# Patient Record
Sex: FEMALE | Race: WHITE | NOT HISPANIC OR LATINO | Employment: OTHER | URBAN - NONMETROPOLITAN AREA
[De-identification: names, ages, dates, MRNs, and addresses within clinical notes are randomized per-mention and may not be internally consistent; named-entity substitution may affect disease eponyms.]

---

## 2017-04-29 ENCOUNTER — APPOINTMENT (OUTPATIENT)
Dept: CT IMAGING | Facility: HOSPITAL | Age: 77
End: 2017-04-29

## 2017-04-29 ENCOUNTER — APPOINTMENT (OUTPATIENT)
Dept: GENERAL RADIOLOGY | Facility: HOSPITAL | Age: 77
End: 2017-04-29

## 2017-04-29 ENCOUNTER — HOSPITAL ENCOUNTER (EMERGENCY)
Facility: HOSPITAL | Age: 77
Discharge: HOME | End: 2017-04-29
Admitting: EMERGENCY MEDICINE

## 2017-04-29 VITALS
SYSTOLIC BLOOD PRESSURE: 142 MMHG | BODY MASS INDEX: 41.59 KG/M2 | RESPIRATION RATE: 16 BRPM | WEIGHT: 265 LBS | OXYGEN SATURATION: 98 % | TEMPERATURE: 98 F | DIASTOLIC BLOOD PRESSURE: 86 MMHG | HEIGHT: 67 IN | HEART RATE: 66 BPM

## 2017-04-29 DIAGNOSIS — M23.92 DERANGEMENT, KNEE INTERNAL, LEFT: ICD-10-CM

## 2017-04-29 DIAGNOSIS — S82.002A CLOSED NONDISPLACED FRACTURE OF LEFT PATELLA, UNSPECIFIED FRACTURE MORPHOLOGY, INITIAL ENCOUNTER: ICD-10-CM

## 2017-04-29 DIAGNOSIS — S16.1XXA CERVICAL STRAIN, INITIAL ENCOUNTER: ICD-10-CM

## 2017-04-29 DIAGNOSIS — R93.89 ABNORMAL CT SCAN, CHEST: ICD-10-CM
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
## 2017-04-29 DIAGNOSIS — S20.211A CHEST WALL CONTUSION, RIGHT, INITIAL ENCOUNTER: ICD-10-CM

## 2017-04-29 LAB
ALBUMIN SERPL-MCNC: 3.7 G/DL (ref 3.5–5)
ALBUMIN/GLOB SERPL: 1.2 G/DL (ref 1.1–2.5)
ALP SERPL-CCNC: 81 U/L (ref 24–120)
ALT SERPL W P-5'-P-CCNC: 33 U/L (ref 0–54)
ANION GAP SERPL CALCULATED.3IONS-SCNC: 10 MMOL/L (ref 4–13)
AST SERPL-CCNC: 41 U/L (ref 7–45)
BASOPHILS # BLD AUTO: 0.02 10*3/MM3 (ref 0–0.2)
BASOPHILS NFR BLD AUTO: 0.3 % (ref 0–2)
BILIRUB SERPL-MCNC: 0.6 MG/DL (ref 0.1–1)
BUN BLD-MCNC: 18 MG/DL (ref 5–21)
BUN/CREAT SERPL: 27.7 (ref 7–25)
CALCIUM SPEC-SCNC: 9.1 MG/DL (ref 8.4–10.4)
CHLORIDE SERPL-SCNC: 104 MMOL/L (ref 98–110)
CO2 SERPL-SCNC: 26 MMOL/L (ref 24–31)
CREAT BLD-MCNC: 0.65 MG/DL (ref 0.5–1.4)
DEPRECATED RDW RBC AUTO: 45.4 FL (ref 40–54)
EOSINOPHIL # BLD AUTO: 0.09 10*3/MM3 (ref 0–0.7)
EOSINOPHIL NFR BLD AUTO: 1.5 % (ref 0–4)
ERYTHROCYTE [DISTWIDTH] IN BLOOD BY AUTOMATED COUNT: 13.4 % (ref 12–15)
GFR SERPL CREATININE-BSD FRML MDRD: 88 ML/MIN/1.73
GLOBULIN UR ELPH-MCNC: 3.2 GM/DL
GLUCOSE BLD-MCNC: 115 MG/DL (ref 70–100)
HCT VFR BLD AUTO: 40.8 % (ref 37–47)
HGB BLD-MCNC: 14.3 G/DL (ref 12–16)
IMM GRANULOCYTES # BLD: 0.02 10*3/MM3 (ref 0–0.03)
IMM GRANULOCYTES NFR BLD: 0.3 % (ref 0–5)
LYMPHOCYTES # BLD AUTO: 0.55 10*3/MM3 (ref 0.72–4.86)
LYMPHOCYTES NFR BLD AUTO: 9.2 % (ref 15–45)
MCH RBC QN AUTO: 32.4 PG (ref 28–32)
MCHC RBC AUTO-ENTMCNC: 35 G/DL (ref 33–36)
MCV RBC AUTO: 92.3 FL (ref 82–98)
MONOCYTES # BLD AUTO: 0.49 10*3/MM3 (ref 0.19–1.3)
MONOCYTES NFR BLD AUTO: 8.2 % (ref 4–12)
NEUTROPHILS # BLD AUTO: 4.8 10*3/MM3 (ref 1.87–8.4)
NEUTROPHILS NFR BLD AUTO: 80.5 % (ref 39–78)
PLATELET # BLD AUTO: 189 10*3/MM3 (ref 130–400)
PMV BLD AUTO: 10.8 FL (ref 6–12)
POTASSIUM BLD-SCNC: 4.3 MMOL/L (ref 3.5–5.3)
PROT SERPL-MCNC: 6.9 G/DL (ref 6.3–8.7)
RBC # BLD AUTO: 4.42 10*6/MM3 (ref 4.2–5.4)
SODIUM BLD-SCNC: 140 MMOL/L (ref 135–145)
WBC NRBC COR # BLD: 5.97 10*3/MM3 (ref 4.8–10.8)

## 2017-04-29 PROCEDURE — 73562 X-RAY EXAM OF KNEE 3: CPT

## 2017-04-29 PROCEDURE — 96361 HYDRATE IV INFUSION ADD-ON: CPT

## 2017-04-29 PROCEDURE — 71260 CT THORAX DX C+: CPT

## 2017-04-29 PROCEDURE — 80053 COMPREHEN METABOLIC PANEL: CPT | Performed by: EMERGENCY MEDICINE

## 2017-04-29 PROCEDURE — 93005 ELECTROCARDIOGRAM TRACING: CPT | Performed by: EMERGENCY MEDICINE

## 2017-04-29 PROCEDURE — 99284 EMERGENCY DEPT VISIT MOD MDM: CPT

## 2017-04-29 PROCEDURE — 0 IOPAMIDOL PER 1 ML: Performed by: PHYSICIAN ASSISTANT

## 2017-04-29 PROCEDURE — 96360 HYDRATION IV INFUSION INIT: CPT

## 2017-04-29 PROCEDURE — 72125 CT NECK SPINE W/O DYE: CPT

## 2017-04-29 PROCEDURE — 71020 HC CHEST PA AND LATERAL: CPT

## 2017-04-29 PROCEDURE — 93010 ELECTROCARDIOGRAM REPORT: CPT | Performed by: INTERNAL MEDICINE

## 2017-04-29 PROCEDURE — 85025 COMPLETE CBC W/AUTO DIFF WBC: CPT | Performed by: EMERGENCY MEDICINE

## 2017-04-29 RX ORDER — MORPHINE SULFATE 4 MG/ML
4 INJECTION, SOLUTION INTRAMUSCULAR; INTRAVENOUS ONCE
Status: DISCONTINUED | OUTPATIENT
Start: 2017-04-29 | End: 2017-04-29 | Stop reason: HOSPADM

## 2017-04-29 RX ORDER — ONDANSETRON 2 MG/ML
4 INJECTION INTRAMUSCULAR; INTRAVENOUS ONCE
Status: DISCONTINUED | OUTPATIENT
Start: 2017-04-29 | End: 2017-04-29 | Stop reason: HOSPADM

## 2017-04-29 RX ORDER — SODIUM CHLORIDE 9 MG/ML
125 INJECTION, SOLUTION INTRAVENOUS CONTINUOUS
Status: DISCONTINUED | OUTPATIENT
Start: 2017-04-29 | End: 2017-04-29 | Stop reason: HOSPADM

## 2017-04-29 RX ORDER — IBUPROFEN 800 MG/1
800 TABLET ORAL ONCE
Status: COMPLETED | OUTPATIENT
Start: 2017-04-29 | End: 2017-04-29

## 2017-04-29 RX ORDER — OXYCODONE HYDROCHLORIDE AND ACETAMINOPHEN 5; 325 MG/1; MG/1
1 TABLET ORAL EVERY 4 HOURS PRN
Qty: 20 TABLET | Refills: 0 | Status: SHIPPED | OUTPATIENT
Start: 2017-04-29 | End: 2019-01-03

## 2017-04-29 RX ADMIN — IOPAMIDOL 100 ML: 755 INJECTION, SOLUTION INTRAVENOUS at 16:17

## 2017-04-29 RX ADMIN — SODIUM CHLORIDE 125 ML/HR: 9 INJECTION, SOLUTION INTRAVENOUS at 15:30

## 2017-04-29 RX ADMIN — IBUPROFEN 800 MG: 800 TABLET ORAL at 17:43

## 2017-05-05 ENCOUNTER — PROCEDURE VISIT (OUTPATIENT)
Dept: OTOLARYNGOLOGY | Facility: CLINIC | Age: 77
End: 2017-05-05

## 2017-05-05 DIAGNOSIS — H90.5 HEARING LOSS, SENSORINEURAL: Primary | ICD-10-CM

## 2017-05-05 PROCEDURE — HEARINGNOCHG: Performed by: AUDIOLOGIST-HEARING AID FITTER

## 2017-07-26 ENCOUNTER — PROCEDURE VISIT (OUTPATIENT)
Dept: OTOLARYNGOLOGY | Facility: CLINIC | Age: 77
End: 2017-07-26

## 2017-07-26 DIAGNOSIS — H90.5 HEARING LOSS, SENSORINEURAL: Primary | ICD-10-CM

## 2017-07-26 PROCEDURE — HEARINGNOCHG: Performed by: AUDIOLOGIST-HEARING AID FITTER

## 2017-07-26 NOTE — PATIENT INSTRUCTIONS
It was recommended that Mrs. Whitman return for hearing aid checks at least every 6 months (sooner if needed). She agreed to this.

## 2017-07-26 NOTE — PROGRESS NOTES
Paradise Whitman, age 77, was seen at this office due to complaints of her left hearing aid not working appropriately. She reported that she had pulled the  out of the hearing aid and it has not worked since. A listening check revealed that neither hearing aid was amplifying. The hearing aids were checked and both  wax guards were found to be plugged with cerumen. These were changed and the hearing aids were cleaned. The hearing aids were placed on the patient and she reported that she could hear well and from both sides again.

## 2018-07-20 ENCOUNTER — PROCEDURE VISIT (OUTPATIENT)
Dept: OTOLARYNGOLOGY | Facility: CLINIC | Age: 78
End: 2018-07-20

## 2018-07-20 DIAGNOSIS — H69.83 DYSFUNCTION OF BOTH EUSTACHIAN TUBES: ICD-10-CM

## 2018-07-20 DIAGNOSIS — H90.3 SENSORINEURAL HEARING LOSS (SNHL) OF BOTH EARS: Primary | ICD-10-CM

## 2018-07-20 PROCEDURE — HEARINGNOCHG: Performed by: OTOLARYNGOLOGY

## 2018-07-20 NOTE — PROGRESS NOTES
Patient came in stating her HA's were not loud enough. With a new battery, the right HA came on and sounded very strong. The left  was broken and had to be replaced. Patient pd $100.00 up front for the new .    Tymps indicated Type C with -320 pressure, 0.9 volume, and 0.1 compliance right/ and a Type B with 0.7 volume left.    Patient was advised to see her PCP for ETD. If medication did not resolve it, she may consider having her PCP refer her back here, as she has not seen our clinic in several years.     No  per HA agreement.

## 2018-10-24 ENCOUNTER — TELEPHONE (OUTPATIENT)
Dept: FAMILY MEDICINE CLINIC | Facility: CLINIC | Age: 78
End: 2018-10-24

## 2018-11-09 DIAGNOSIS — M12.811 ROTATOR CUFF ARTHROPATHY OF RIGHT SHOULDER: Primary | ICD-10-CM

## 2018-11-14 ENCOUNTER — TELEPHONE (OUTPATIENT)
Dept: FAMILY MEDICINE CLINIC | Facility: CLINIC | Age: 78
End: 2018-11-14

## 2018-11-26 RX ORDER — TRAMADOL HYDROCHLORIDE 50 MG/1
TABLET ORAL
Qty: 90 TABLET | Refills: 0 | OUTPATIENT
Start: 2018-11-26

## 2018-11-27 RX ORDER — TRAMADOL HYDROCHLORIDE 50 MG/1
TABLET ORAL
Qty: 90 TABLET | Refills: 3 | Status: SHIPPED | OUTPATIENT
Start: 2018-11-27 | End: 2019-01-03

## 2018-12-06 ENCOUNTER — TELEPHONE (OUTPATIENT)
Dept: FAMILY MEDICINE CLINIC | Facility: CLINIC | Age: 78
End: 2018-12-06

## 2018-12-06 NOTE — TELEPHONE ENCOUNTER
Patient insurance will not cover the Lidocaine Patch I have attempted the PA for the medication and it was unfavorable. Please review denial and submit new rx for patient to [facility].    Thank You

## 2019-01-03 ENCOUNTER — OFFICE VISIT (OUTPATIENT)
Dept: FAMILY MEDICINE CLINIC | Facility: CLINIC | Age: 79
End: 2019-01-03

## 2019-01-03 VITALS
RESPIRATION RATE: 19 BRPM | BODY MASS INDEX: 41.78 KG/M2 | SYSTOLIC BLOOD PRESSURE: 110 MMHG | HEIGHT: 66 IN | OXYGEN SATURATION: 98 % | DIASTOLIC BLOOD PRESSURE: 74 MMHG | HEART RATE: 82 BPM | WEIGHT: 260 LBS | TEMPERATURE: 98.9 F

## 2019-01-03 DIAGNOSIS — N39.46 MIXED STRESS AND URGE URINARY INCONTINENCE: ICD-10-CM

## 2019-01-03 DIAGNOSIS — R19.7 DIARRHEA, UNSPECIFIED TYPE: ICD-10-CM

## 2019-01-03 DIAGNOSIS — N39.0 URINARY TRACT INFECTION WITHOUT HEMATURIA, SITE UNSPECIFIED: ICD-10-CM

## 2019-01-03 DIAGNOSIS — J01.00 ACUTE NON-RECURRENT MAXILLARY SINUSITIS: Primary | ICD-10-CM

## 2019-01-03 PROCEDURE — 99213 OFFICE O/P EST LOW 20 MIN: CPT | Performed by: NURSE PRACTITIONER

## 2019-01-03 RX ORDER — PHENAZOPYRIDINE HYDROCHLORIDE 200 MG/1
200 TABLET, FILM COATED ORAL 3 TIMES DAILY PRN
Qty: 10 TABLET | Refills: 0 | Status: SHIPPED | OUTPATIENT
Start: 2019-01-03 | End: 2019-04-26 | Stop reason: SDUPTHER

## 2019-01-03 RX ORDER — VALSARTAN/HYDROCHLOROTHIAZIDE 160-12.5MG
TABLET ORAL
COMMUNITY
Start: 2018-12-18 | End: 2019-03-04 | Stop reason: CLARIF

## 2019-01-03 RX ORDER — AMOXICILLIN AND CLAVULANATE POTASSIUM 875; 125 MG/1; MG/1
1 TABLET, FILM COATED ORAL 2 TIMES DAILY
Qty: 14 TABLET | Refills: 0 | Status: SHIPPED | OUTPATIENT
Start: 2019-01-03 | End: 2019-01-10

## 2019-01-03 RX ORDER — SERTRALINE HYDROCHLORIDE 100 MG/1
TABLET, FILM COATED ORAL
COMMUNITY
Start: 2018-12-18 | End: 2019-02-11 | Stop reason: SDUPTHER

## 2019-01-03 NOTE — PROGRESS NOTES
Chief Complaint   Patient presents with   • Sinus Problem   • Difficulty Urinating        Subjective   Paradise Whitman is a 78 y.o.  female who presents today for sinus problems that started a few days ago.  The urinary problems are consistent with UTI.    HPI:  She has had much sinus pressure and post nasal drainage about 3 days ago.  She has been under stress with her  who has been in the hospital and is now back at the nursing home.  She has had diarrhea, incontinent, and feels that her UTI is secondary to that.  She will sit in the dirty pad for a few hours while tending to her .  She feels unable to void during this visit despite drinking some water while here.    Paradise Whitman  has a past medical history of Anxiety, Depression, and Hypertension.    Allergies   Allergen Reactions   • Hydrocodone Swelling   • Bactrim [Sulfamethoxazole-Trimethoprim] Nausea Only   • Sulfa Antibiotics Nausea Only       Current Outpatient Medications:   •  DIOVAN -12.5 MG per tablet, , Disp: , Rfl:   •  sertraline (ZOLOFT) 100 MG tablet, , Disp: , Rfl:   •  sertraline (ZOLOFT) 50 MG tablet, sertraline 50 mg tablet  Take 1 tablet every day by oral route., Disp: , Rfl:   Past Medical History:   Diagnosis Date   • Anxiety    • Depression    • Hypertension      Past Surgical History:   Procedure Laterality Date   • BREAST SURGERY     • CHOLECYSTECTOMY     • HYSTERECTOMY       Social History     Socioeconomic History   • Marital status:      Spouse name: Not on file   • Number of children: Not on file   • Years of education: Not on file   • Highest education level: Not on file   Tobacco Use   • Smoking status: Never Smoker   • Smokeless tobacco: Never Used   Substance and Sexual Activity   • Alcohol use: No     Frequency: Never   • Drug use: No   • Sexual activity: Defer     Family History   Problem Relation Age of Onset   • No Known Problems Mother    • No Known Problems Father        Family history,  "surgical history, past medical history, Allergies and med's reviewed with patient today and updated in [facility] EMR.     ROS:  Review of Systems   Constitutional: Positive for fatigue.   HENT: Positive for congestion, postnasal drip, sinus pressure and sinus pain.    Eyes: Negative.    Respiratory: Negative.    Cardiovascular: Negative.    Gastrointestinal: Positive for diarrhea.   Endocrine: Negative.    Genitourinary: Positive for decreased urine volume, difficulty urinating and urgency.   Musculoskeletal: Negative.    Skin: Negative.    Allergic/Immunologic: Negative.    Neurological: Negative.    Hematological: Negative.    Psychiatric/Behavioral: Negative.        OBJECTIVE:  Vitals:    01/03/19 1354   BP: 110/74   BP Location: Right arm   Patient Position: Sitting   Cuff Size: Adult   Pulse: 82   Resp: 19   Temp: 98.9 °F (37.2 °C)   TempSrc: Temporal   SpO2: 98%   Weight: 118 kg (260 lb)   Height: 167.6 cm (66\")     Physical Exam   Constitutional: She appears well-nourished.   HENT:   Head: Normocephalic and atraumatic.   Right Ear: External ear normal.   Left Ear: External ear normal.   Nose: Nose normal.   Mouth/Throat: Oropharynx is clear and moist.   Left erythematous TM with moderate effusion.  Post nasal drainage.   Eyes: Conjunctivae and EOM are normal. Pupils are equal, round, and reactive to light.   Neck: Normal range of motion. Neck supple.   Cardiovascular: Normal rate, regular rhythm and normal heart sounds.   Pulmonary/Chest: Effort normal and breath sounds normal.   Abdominal: Soft. Bowel sounds are normal.   Musculoskeletal: Normal range of motion.   Skin: Skin is warm and dry.   Psychiatric: She has a normal mood and affect. Her behavior is normal. Judgment and thought content normal.   Tearful.   Nursing note and vitals reviewed.      ASSESSMENT/ PLAN:    Paradise was seen today for sinus problem and difficulty urinating.    Diagnoses and all orders for this visit:    Acute non-recurrent maxillary " sinusitis  -     amoxicillin-clavulanate (AUGMENTIN) 875-125 MG per tablet; Take 1 tablet by mouth 2 (Two) Times a Day for 7 days.    Diarrhea, unspecified type    Urinary tract infection without hematuria, site unspecified  -     phenazopyridine (PYRIDIUM) 200 MG tablet; Take 1 tablet by mouth 3 (Three) Times a Day As Needed for bladder spasms.    Mixed stress and urge urinary incontinence        Orders Placed Today:     New Medications Ordered This Visit   Medications   • amoxicillin-clavulanate (AUGMENTIN) 875-125 MG per tablet     Sig: Take 1 tablet by mouth 2 (Two) Times a Day for 7 days.     Dispense:  14 tablet     Refill:  0   • phenazopyridine (PYRIDIUM) 200 MG tablet     Sig: Take 1 tablet by mouth 3 (Three) Times a Day As Needed for bladder spasms.     Dispense:  10 tablet     Refill:  0        Management Plan:     An After Visit Summary was printed and given to the patient at discharge.    Follow-up: Return in about 3 months (around 4/3/2019) for Next scheduled follow up.    Lisa Ruiz, APRN 1/3/2019 2:38 PM  This note was electronically signed.

## 2019-01-10 ENCOUNTER — TELEPHONE (OUTPATIENT)
Dept: FAMILY MEDICINE CLINIC | Facility: CLINIC | Age: 79
End: 2019-01-10

## 2019-01-14 ENCOUNTER — OFFICE VISIT (OUTPATIENT)
Dept: FAMILY MEDICINE CLINIC | Facility: CLINIC | Age: 79
End: 2019-01-14

## 2019-01-14 VITALS
WEIGHT: 257 LBS | RESPIRATION RATE: 21 BRPM | DIASTOLIC BLOOD PRESSURE: 77 MMHG | BODY MASS INDEX: 41.3 KG/M2 | TEMPERATURE: 97.8 F | HEIGHT: 66 IN | SYSTOLIC BLOOD PRESSURE: 120 MMHG | OXYGEN SATURATION: 97 % | HEART RATE: 76 BPM

## 2019-01-14 DIAGNOSIS — S39.012A ACUTE MYOFASCIAL STRAIN OF LUMBAR REGION, INITIAL ENCOUNTER: ICD-10-CM

## 2019-01-14 DIAGNOSIS — G89.29 CHRONIC RIGHT SHOULDER PAIN: ICD-10-CM

## 2019-01-14 DIAGNOSIS — M25.511 CHRONIC RIGHT SHOULDER PAIN: ICD-10-CM

## 2019-01-14 DIAGNOSIS — J01.01 ACUTE RECURRENT MAXILLARY SINUSITIS: Primary | ICD-10-CM

## 2019-01-14 PROCEDURE — 99213 OFFICE O/P EST LOW 20 MIN: CPT | Performed by: NURSE PRACTITIONER

## 2019-01-14 PROCEDURE — 96372 THER/PROPH/DIAG INJ SC/IM: CPT | Performed by: NURSE PRACTITIONER

## 2019-01-14 RX ORDER — METHYLPREDNISOLONE ACETATE 80 MG/ML
80 INJECTION, SUSPENSION INTRA-ARTICULAR; INTRALESIONAL; INTRAMUSCULAR; SOFT TISSUE ONCE
Status: COMPLETED | OUTPATIENT
Start: 2019-01-14 | End: 2019-01-14

## 2019-01-14 RX ORDER — METHYLPREDNISOLONE ACETATE 40 MG/ML
80 INJECTION, SUSPENSION INTRA-ARTICULAR; INTRALESIONAL; INTRAMUSCULAR; SOFT TISSUE ONCE
Status: DISCONTINUED | OUTPATIENT
Start: 2019-01-14 | End: 2019-01-14

## 2019-01-14 RX ORDER — AMOXICILLIN 500 MG/1
500 CAPSULE ORAL 3 TIMES DAILY
Qty: 30 CAPSULE | Refills: 0 | Status: SHIPPED | OUTPATIENT
Start: 2019-01-14 | End: 2019-01-24

## 2019-01-14 RX ADMIN — METHYLPREDNISOLONE ACETATE 80 MG: 80 INJECTION, SUSPENSION INTRA-ARTICULAR; INTRALESIONAL; INTRAMUSCULAR; SOFT TISSUE at 14:07

## 2019-01-14 NOTE — PROGRESS NOTES
Subjective   Paradise Whitman is a 78 y.o. female presents for continued sinusitis concerns as well as a pulled back.     History of Present Illness Ms. Whitman has a 3 week history of sinusitis.  She states that she did get some better after the antibiotics but now is back as bad as before.  She also pulled her lower back trying to get something out from under the bed.  She wishes to discuss this at this visit as well.  She has chronic right shoulder pain with a known right rotator cuff tear.    The following portions of the patient's history were reviewed and updated as appropriate: allergies, current medications, past family history, past medical history, past social history, past surgical history and problem list.    Review of Systems   Constitutional: Positive for fatigue.   HENT: Positive for congestion, facial swelling, postnasal drip and sinus pressure.         Mucus is reported as green   Eyes: Negative.    Respiratory: Positive for cough.         Cough is mainly in the morning.   Cardiovascular: Negative.    Gastrointestinal: Positive for nausea.   Endocrine: Negative.    Genitourinary: Positive for urinary incontinence.   Musculoskeletal: Positive for back pain and myalgias.   Skin: Negative.    Allergic/Immunologic: Negative.    Neurological: Negative.    Hematological: Negative.    Psychiatric/Behavioral: Negative.        Objective   Physical Exam   Constitutional: She is oriented to person, place, and time. She appears well-developed and well-nourished.   HENT:   Head: Normocephalic and atraumatic.   Right Ear: External ear normal.   Left Ear: External ear normal.   Nose: Nose normal.   Mouth/Throat: Oropharynx is clear and moist.   Maxillary sinus tenderness   Eyes: Conjunctivae and EOM are normal. Pupils are equal, round, and reactive to light.   Neck: Normal range of motion. Neck supple.   Cardiovascular: Normal rate, regular rhythm and normal heart sounds.   Pulmonary/Chest: Effort normal and breath  sounds normal.   Abdominal: Soft.   Musculoskeletal: She exhibits tenderness.   Low (lumbar) tenderness with spasm  Right shoulder tenderness and loss of ROM with known rotator cuff tear   Neurological: She is alert and oriented to person, place, and time.   Skin: Skin is warm and dry.   Psychiatric: She has a normal mood and affect. Her behavior is normal. Judgment and thought content normal.   Nursing note and vitals reviewed.        Assessment/Plan   Paradise was seen today for sinus problem and back pain.    Diagnoses and all orders for this visit:    Acute recurrent maxillary sinusitis  -     methylPREDNISolone acetate (DEPO-medrol) injection 80 mg; Inject 2 mL into the appropriate muscle as directed by prescriber 1 (One) Time.  -     amoxicillin (AMOXIL) 500 MG capsule; Take 1 capsule by mouth 3 (Three) Times a Day for 10 days.    Acute myofascial strain of lumbar region, initial encounter  -     methylPREDNISolone acetate (DEPO-medrol) injection 80 mg; Inject 2 mL into the appropriate muscle as directed by prescriber 1 (One) Time.    Chronic right shoulder pain    Follow up in 3 months or PRN.

## 2019-02-06 RX ORDER — DIVALPROEX SODIUM 250 MG/1
TABLET, DELAYED RELEASE ORAL
Qty: 60 TABLET | Refills: 5 | Status: SHIPPED | OUTPATIENT
Start: 2019-02-06 | End: 2019-08-21 | Stop reason: SDUPTHER

## 2019-02-09 RX ORDER — LIDOCAINE 50 MG/G
PATCH TOPICAL
Qty: 30 EACH | Refills: 5 | Status: SHIPPED | OUTPATIENT
Start: 2019-02-09 | End: 2019-06-21 | Stop reason: SDUPTHER

## 2019-02-11 RX ORDER — SERTRALINE HYDROCHLORIDE 100 MG/1
TABLET, FILM COATED ORAL
Qty: 45 TABLET | Refills: 5 | Status: SHIPPED | OUTPATIENT
Start: 2019-02-11 | End: 2019-08-21 | Stop reason: SDUPTHER

## 2019-03-04 DIAGNOSIS — I10 ESSENTIAL HYPERTENSION: Primary | ICD-10-CM

## 2019-03-04 RX ORDER — VALSARTAN AND HYDROCHLOROTHIAZIDE 80; 12.5 MG/1; MG/1
1 TABLET, FILM COATED ORAL DAILY
Qty: 90 TABLET | Refills: 3 | Status: SHIPPED | OUTPATIENT
Start: 2019-03-04 | End: 2019-04-02 | Stop reason: SDUPTHER

## 2019-04-02 DIAGNOSIS — I10 ESSENTIAL HYPERTENSION: ICD-10-CM

## 2019-04-02 RX ORDER — VALSARTAN AND HYDROCHLOROTHIAZIDE 80; 12.5 MG/1; MG/1
1 TABLET, FILM COATED ORAL DAILY
Qty: 90 TABLET | Refills: 3 | Status: SHIPPED | OUTPATIENT
Start: 2019-04-02 | End: 2019-04-19 | Stop reason: SDUPTHER

## 2019-04-03 PROBLEM — N13.5 STRICTURE OF URETER: Status: ACTIVE | Noted: 2019-04-03

## 2019-04-03 PROBLEM — N32.81 OVERACTIVE BLADDER: Status: ACTIVE | Noted: 2019-04-03

## 2019-04-03 PROBLEM — N39.0 RECURRENT URINARY TRACT INFECTION: Status: ACTIVE | Noted: 2019-04-03

## 2019-04-03 PROBLEM — N20.0 KIDNEY STONE: Status: ACTIVE | Noted: 2017-04-18

## 2019-04-03 RX ORDER — VALSARTAN/HYDROCHLOROTHIAZIDE 160-12.5MG
1 TABLET ORAL DAILY
Qty: 30 TABLET | Refills: 5 | OUTPATIENT
Start: 2019-04-03

## 2019-04-11 ENCOUNTER — CLINICAL SUPPORT (OUTPATIENT)
Dept: FAMILY MEDICINE CLINIC | Facility: CLINIC | Age: 79
End: 2019-04-11

## 2019-04-11 DIAGNOSIS — Z00.00 MEDICARE ANNUAL WELLNESS VISIT, SUBSEQUENT: Primary | ICD-10-CM

## 2019-04-11 DIAGNOSIS — Z00.00 ROUTINE MEDICAL EXAM: ICD-10-CM

## 2019-04-18 ENCOUNTER — TELEPHONE (OUTPATIENT)
Dept: FAMILY MEDICINE CLINIC | Facility: CLINIC | Age: 79
End: 2019-04-18

## 2019-04-18 NOTE — TELEPHONE ENCOUNTER
Lisa, would you please call this number [phone] BORROWED PHONE  Pt would like to speak to Lisa SALOMON, regarding her husbands upcoming surgery tomorrow.

## 2019-04-19 RX ORDER — VALSARTAN/HYDROCHLOROTHIAZIDE 160-12.5MG
TABLET ORAL
Qty: 30 TABLET | Refills: 5 | Status: SHIPPED | OUTPATIENT
Start: 2019-04-19 | End: 2019-11-04 | Stop reason: SDUPTHER

## 2019-04-26 ENCOUNTER — OFFICE VISIT (OUTPATIENT)
Dept: FAMILY MEDICINE CLINIC | Facility: CLINIC | Age: 79
End: 2019-04-26

## 2019-04-26 VITALS
HEIGHT: 66 IN | OXYGEN SATURATION: 97 % | HEART RATE: 77 BPM | TEMPERATURE: 97.6 F | WEIGHT: 261.2 LBS | BODY MASS INDEX: 41.98 KG/M2 | SYSTOLIC BLOOD PRESSURE: 150 MMHG | DIASTOLIC BLOOD PRESSURE: 76 MMHG

## 2019-04-26 DIAGNOSIS — R11.0 NAUSEA: ICD-10-CM

## 2019-04-26 DIAGNOSIS — N63.20 LEFT BREAST MASS: ICD-10-CM

## 2019-04-26 DIAGNOSIS — N39.0 URINARY TRACT INFECTION WITHOUT HEMATURIA, SITE UNSPECIFIED: ICD-10-CM

## 2019-04-26 DIAGNOSIS — Z00.00 ENCOUNTER FOR MEDICARE ANNUAL WELLNESS EXAM: Primary | ICD-10-CM

## 2019-04-26 DIAGNOSIS — N64.9 DISORDER OF BREAST: ICD-10-CM

## 2019-04-26 DIAGNOSIS — E66.01 MORBIDLY OBESE (HCC): ICD-10-CM

## 2019-04-26 DIAGNOSIS — L03.114 CELLULITIS OF LEFT ARM: ICD-10-CM

## 2019-04-26 PROCEDURE — G0439 PPPS, SUBSEQ VISIT: HCPCS | Performed by: NURSE PRACTITIONER

## 2019-04-26 RX ORDER — ONDANSETRON 4 MG/1
4 TABLET, ORALLY DISINTEGRATING ORAL EVERY 8 HOURS PRN
Qty: 30 TABLET | Refills: 1 | Status: SHIPPED | OUTPATIENT
Start: 2019-04-26 | End: 2021-01-05 | Stop reason: SDUPTHER

## 2019-04-26 RX ORDER — TRAMADOL HYDROCHLORIDE 50 MG/1
50 TABLET ORAL 3 TIMES DAILY
COMMUNITY
End: 2019-05-31 | Stop reason: SDUPTHER

## 2019-04-26 RX ORDER — CEPHALEXIN 500 MG/1
500 CAPSULE ORAL 3 TIMES DAILY
Qty: 21 CAPSULE | Refills: 0 | Status: SHIPPED | OUTPATIENT
Start: 2019-04-26 | End: 2019-07-23

## 2019-04-26 RX ORDER — PHENAZOPYRIDINE HYDROCHLORIDE 200 MG/1
200 TABLET, FILM COATED ORAL 3 TIMES DAILY PRN
Qty: 10 TABLET | Refills: 0 | Status: SHIPPED | OUTPATIENT
Start: 2019-04-26 | End: 2019-10-15

## 2019-04-26 NOTE — PROGRESS NOTES
QUICK REFERENCE INFORMATION:  The ABCs of the Annual Wellness Visit    Subsequent Medicare Wellness Visit     HEALTH RISK ASSESSMENT    : 1940    Recent Hospitalizations:  No hospitalization(s) within the last year..  ccc      Current Medical Providers:  Patient Care Team:  Provider, No Known as PCP - Lisa Serrano APRN as PCP - Claims Attributed        Smoking Status:  Social History     Tobacco Use   Smoking Status Never Smoker   Smokeless Tobacco Never Used       Alcohol Consumption:  Social History     Substance and Sexual Activity   Alcohol Use No   • Frequency: Never       Depression Screen:   PHQ-2/PHQ-9 Depression Screening 2019   Little interest or pleasure in doing things 0   Feeling down, depressed, or hopeless 0   Total Score 0       Health Habits and Functional and Cognitive Screening:  Functional & Cognitive Status 2019   Do you have difficulty preparing food and eating? No   Do you have difficulty bathing yourself, getting dressed or grooming yourself? No   Do you have difficulty using the toilet? No   Do you have difficulty moving around from place to place? No   Do you have trouble with steps or getting out of a bed or a chair? No   In the past year have you fallen or experienced a near fall? Yes   Current Diet Limited Junk Food   Dental Exam Up to date   Eye Exam Up to date   Exercise (times per week) 0 times per week   Current Exercise Activities Include None   Do you need help using the phone?  No   Are you deaf or do you have serious difficulty hearing?  No   Do you need help with transportation? No   Do you need help shopping? No   Do you need help preparing meals?  No   Do you need help with housework?  Yes   Do you need help with laundry? No   Do you need help taking your medications? No   Do you need help managing money? No   Do you ever drive or ride in a car without wearing a seat belt? No   Have you felt unusual stress, anger or loneliness in the last  month? No   Who do you live with? Alone   If you need help, do you have trouble finding someone available to you? No   Have you been bothered in the last four weeks by sexual problems? No   Do you have difficulty concentrating, remembering or making decisions? No           Does the patient have evidence of cognitive impairment? No    Asiprin use counseling: Taking ASA appropriately as indicated      Recent Lab Results:  Lab results reviewed and present in chart under lab tab.                 Age-appropriate Screening Schedule:  Refer to the list below for future screening recommendations based on patient's age, sex and/or medical conditions. Orders for these recommended tests are listed in the plan section. The patient has been provided with a written plan.    Health Maintenance   Topic Date Due   • PNEUMOCOCCAL VACCINES (65+ LOW/MEDIUM RISK) (1 of 2 - PCV13) 04/26/2019 (Originally 4/22/2003)   • ZOSTER VACCINE (1 of 2) 04/26/2019 (Originally 4/22/1988)   • INFLUENZA VACCINE  08/01/2019   • TDAP/TD VACCINES  Discontinued        Subjective   History of Present Illness    Paradise Whitman is a 79 y.o. female who presents for an Annual Wellness Visit.    The following portions of the patient's history were reviewed and updated as appropriate: allergies, current medications, past family history, past medical history, past social history, past surgical history and problem list.    Outpatient Medications Prior to Visit   Medication Sig Dispense Refill   • DIOVAN -12.5 MG per tablet TAKE ONE TABLET BY MOUTH EVERY DAY 30 tablet 5   • divalproex (DEPAKOTE) 250 MG DR tablet TAKE ONE TABLET BY MOUTH TWICE A DAY 60 tablet 5   • lidocaine (LIDODERM) 5 % APPLY ONE PATCH TO EACH KNEE EVERY DAY 30 each 5   • phenazopyridine (PYRIDIUM) 200 MG tablet Take 1 tablet by mouth 3 (Three) Times a Day As Needed for bladder spasms. 10 tablet 0   • sertraline (ZOLOFT) 100 MG tablet TAKE ONE AND ONE-HALF TABLETS BY MOUTH EVERY DAY 45 tablet  5   • sertraline (ZOLOFT) 50 MG tablet sertraline 50 mg tablet   Take 1 tablet every day by oral route.     • traMADol (ULTRAM) 50 MG tablet Take 50 mg by mouth 3 (Three) Times a Day.       No facility-administered medications prior to visit.        Patient Active Problem List   Diagnosis   • Kidney stone   • Overactive bladder   • Recurrent urinary tract infection   • Stricture of ureter   • Morbidly obese (CMS/[facility])       Advance Care Planning:  Patient has an advance directive - a copy has not been provided. Have asked the patient to send this to us to add to record    Identification of Risk Factors:  Risk factors include: weight , unhealthy diet, cardiovascular risk, increased fall risk, chronic pain, caretaker stress and depression.    Review of Systems   Constitutional: Positive for fatigue. Negative for fever and unexpected weight change.   HENT: Negative.  Negative for facial swelling, sore throat and trouble swallowing.    Eyes: Negative.  Negative for photophobia, discharge and visual disturbance.   Respiratory: Negative.  Negative for cough, chest tightness and shortness of breath.    Cardiovascular: Negative.  Negative for chest pain and palpitations.   Gastrointestinal: Positive for nausea. Negative for abdominal pain, diarrhea and vomiting.   Endocrine: Negative.  Negative for polydipsia, polyphagia and polyuria.   Genitourinary: Negative.  Negative for dysuria, flank pain and frequency.   Musculoskeletal: Positive for arthralgias, back pain, gait problem and joint swelling. Negative for neck pain.   Skin: Positive for wound. Negative for rash.        Cat scratch on the left forearm.   Allergic/Immunologic: Negative.    Neurological: Negative for dizziness, light-headedness and headaches.   Hematological: Negative.    Psychiatric/Behavioral: Negative.  Negative for self-injury and suicidal ideas.       Compared to one year ago, the patient feels her physical health is worse.  Compared to one year ago,  "the patient feels her mental health is worse.    Objective     Physical Exam   Constitutional: She is oriented to person, place, and time. She appears well-developed and well-nourished. No distress.   HENT:   Head: Normocephalic and atraumatic.   Right Ear: External ear normal.   Left Ear: External ear normal.   Nose: Nose normal.   Mouth/Throat: Oropharynx is clear and moist.   Eyes: Conjunctivae and EOM are normal. Pupils are equal, round, and reactive to light.   Neck: Normal range of motion. Neck supple.   Cardiovascular: Normal rate, regular rhythm, normal heart sounds and intact distal pulses.   No murmur heard.  Pulmonary/Chest: Effort normal and breath sounds normal. No respiratory distress. Left breast exhibits mass and tenderness.       Abdominal: Soft. Bowel sounds are normal. She exhibits no distension. There is no tenderness.   Genitourinary:   Genitourinary Comments: Deferred.   Musculoskeletal: Normal range of motion. She exhibits no edema.   Neurological: She is alert and oriented to person, place, and time.   Skin: Skin is warm and dry. Capillary refill takes less than 2 seconds. She is not diaphoretic. No erythema.   Psychiatric: She has a normal mood and affect. Her behavior is normal. Judgment and thought content normal.   Nursing note and vitals reviewed.      Vitals:    04/26/19 1131   BP: 150/76   BP Location: Right arm   Patient Position: Sitting   Cuff Size: Adult   Pulse: 77   Temp: 97.6 °F (36.4 °C)   SpO2: 97%   Weight: 118 kg (261 lb 3.2 oz)   Height: 167.6 cm (65.98\")   PainSc:   4   PainLoc: Generalized       Patient's Body mass index is 42.18 kg/m². BMI is above normal parameters. Recommendations include: none (medical contraindication) and nutrition counseling.      Assessment/Plan   Patient Self-Management and Personalized Health Advice  The patient has been provided with information about: diet, exercise, weight management, prevention of cardiac or vascular disease, the " relationship between weight and GERD and fall prevention and preventive services including:   · Colorectal cancer screening, cologuard test ordered, Exercise counseling provided, Nutrition counseling provided, Screening mammography, referral placed.    Visit Diagnoses:    ICD-10-CM ICD-9-CM   1. Encounter for Medicare annual wellness exam Z00.00 V70.0   2. Urinary tract infection without hematuria, site unspecified N39.0 599.0   3. Cellulitis of left arm L03.114 682.3   4. Nausea R11.0 787.02   5. Morbidly obese (CMS/[facility]) E66.01 278.01   6. Left breast mass N63.20 611.72       No orders of the defined types were placed in this encounter.      Outpatient Encounter Medications as of 4/26/2019   Medication Sig Dispense Refill   • DIOVAN -12.5 MG per tablet TAKE ONE TABLET BY MOUTH EVERY DAY 30 tablet 5   • divalproex (DEPAKOTE) 250 MG DR tablet TAKE ONE TABLET BY MOUTH TWICE A DAY 60 tablet 5   • lidocaine (LIDODERM) 5 % APPLY ONE PATCH TO EACH KNEE EVERY DAY 30 each 5   • phenazopyridine (PYRIDIUM) 200 MG tablet Take 1 tablet by mouth 3 (Three) Times a Day As Needed for bladder spasms. 10 tablet 0   • sertraline (ZOLOFT) 100 MG tablet TAKE ONE AND ONE-HALF TABLETS BY MOUTH EVERY DAY 45 tablet 5   • sertraline (ZOLOFT) 50 MG tablet sertraline 50 mg tablet   Take 1 tablet every day by oral route.     • traMADol (ULTRAM) 50 MG tablet Take 50 mg by mouth 3 (Three) Times a Day.       No facility-administered encounter medications on file as of 4/26/2019.        Reviewed use of high risk medication in the elderly: yes  Reviewed for potential of harmful drug interactions in the elderly: not applicable    Follow Up:  Return in about 3 months (around 7/26/2019) for Next scheduled follow up.     An After Visit Summary and PPPS with all of these plans were given to the patient.

## 2019-05-06 ENCOUNTER — TELEPHONE (OUTPATIENT)
Dept: FAMILY MEDICINE CLINIC | Facility: CLINIC | Age: 79
End: 2019-05-06

## 2019-05-06 DIAGNOSIS — N63.20 LEFT BREAST MASS: ICD-10-CM

## 2019-05-06 DIAGNOSIS — N64.9 DISORDER OF BREAST: ICD-10-CM

## 2019-05-06 NOTE — TELEPHONE ENCOUNTER
"Rayna called to report a \"Bi-red 5\" result on Pt Mammogram. Recommends Ultrasound biopsy. Mammo results are scanned into Pt chart.   "

## 2019-05-10 DIAGNOSIS — N63.20 LEFT BREAST MASS: Primary | ICD-10-CM

## 2019-05-29 ENCOUNTER — TRANSCRIBE ORDERS (OUTPATIENT)
Dept: ADMINISTRATIVE | Facility: HOSPITAL | Age: 79
End: 2019-05-29

## 2019-05-29 DIAGNOSIS — N63.20 MASS OF BREAST, LEFT: Primary | ICD-10-CM

## 2019-05-29 RX ORDER — TRAMADOL HYDROCHLORIDE 50 MG/1
TABLET ORAL
Qty: 90 TABLET | Refills: 3 | OUTPATIENT
Start: 2019-05-29

## 2019-05-31 ENCOUNTER — RESULTS ENCOUNTER (OUTPATIENT)
Dept: FAMILY MEDICINE CLINIC | Facility: CLINIC | Age: 79
End: 2019-05-31

## 2019-05-31 ENCOUNTER — CLINICAL SUPPORT (OUTPATIENT)
Dept: FAMILY MEDICINE CLINIC | Facility: CLINIC | Age: 79
End: 2019-05-31

## 2019-05-31 DIAGNOSIS — Z79.899 ENCOUNTER FOR LONG-TERM (CURRENT) USE OF HIGH-RISK MEDICATION: ICD-10-CM

## 2019-05-31 DIAGNOSIS — N39.0 URINARY TRACT INFECTION WITHOUT HEMATURIA, SITE UNSPECIFIED: Primary | ICD-10-CM

## 2019-05-31 LAB
BILIRUB BLD-MCNC: NEGATIVE MG/DL
CLARITY, POC: CLEAR
COLOR UR: ABNORMAL
GLUCOSE UR STRIP-MCNC: NEGATIVE MG/DL
KETONES UR QL: ABNORMAL
LEUKOCYTE EST, POC: NEGATIVE
NITRITE UR-MCNC: NEGATIVE MG/ML
PH UR: 7 [PH] (ref 5–8)
PROT UR STRIP-MCNC: NEGATIVE MG/DL
RBC # UR STRIP: NEGATIVE /UL
SP GR UR: 1.02 (ref 1–1.03)
UROBILINOGEN UR QL: ABNORMAL

## 2019-05-31 PROCEDURE — 81003 URINALYSIS AUTO W/O SCOPE: CPT | Performed by: FAMILY MEDICINE

## 2019-05-31 RX ORDER — TRAMADOL HYDROCHLORIDE 50 MG/1
50 TABLET ORAL 3 TIMES DAILY
Qty: 90 TABLET | Refills: 1 | Status: SHIPPED | OUTPATIENT
Start: 2019-05-31 | End: 2019-08-09 | Stop reason: SDUPTHER

## 2019-05-31 NOTE — PROGRESS NOTES
Patient presented to office for UDS and UDS contract so you could get her Tramadol refilled.  While here patient also stated that she felt like she may be having urine tract issues and asked if I could order a UA.  A urinalysis was ordered and results viewed by Lisa Ruiz.  Test was normal with no evidence of a uti.

## 2019-06-10 ENCOUNTER — HOSPITAL ENCOUNTER (OUTPATIENT)
Dept: ULTRASOUND IMAGING | Facility: HOSPITAL | Age: 79
End: 2019-06-10

## 2019-06-10 ENCOUNTER — APPOINTMENT (OUTPATIENT)
Dept: MAMMOGRAPHY | Facility: HOSPITAL | Age: 79
End: 2019-06-10

## 2019-06-14 ENCOUNTER — APPOINTMENT (OUTPATIENT)
Dept: MAMMOGRAPHY | Facility: HOSPITAL | Age: 79
End: 2019-06-14

## 2019-06-14 ENCOUNTER — APPOINTMENT (OUTPATIENT)
Dept: ULTRASOUND IMAGING | Facility: HOSPITAL | Age: 79
End: 2019-06-14

## 2019-06-21 ENCOUNTER — HOSPITAL ENCOUNTER (OUTPATIENT)
Dept: MAMMOGRAPHY | Facility: HOSPITAL | Age: 79
Discharge: HOME | End: 2019-06-21

## 2019-06-21 ENCOUNTER — HOSPITAL ENCOUNTER (OUTPATIENT)
Dept: ULTRASOUND IMAGING | Facility: HOSPITAL | Age: 79
Discharge: HOME | End: 2019-06-21
Admitting: RADIOLOGY

## 2019-06-21 DIAGNOSIS — N63.20 LEFT BREAST MASS: ICD-10-CM

## 2019-06-21 DIAGNOSIS — N63.20 MASS OF BREAST, LEFT: ICD-10-CM

## 2019-06-21 PROCEDURE — 88305 TISSUE EXAM BY PATHOLOGIST: CPT | Performed by: NURSE PRACTITIONER

## 2019-06-21 RX ORDER — LIDOCAINE 50 MG/G
PATCH TOPICAL
Qty: 60 EACH | Refills: 5 | Status: SHIPPED | OUTPATIENT
Start: 2019-06-21 | End: 2020-04-22

## 2019-06-24 LAB
CYTO UR: NORMAL
LAB AP CASE REPORT: NORMAL
LAB AP CLINICAL INFORMATION: NORMAL
PATH REPORT.FINAL DX SPEC: NORMAL
PATH REPORT.GROSS SPEC: NORMAL

## 2019-06-28 ENCOUNTER — TELEPHONE (OUTPATIENT)
Dept: FAMILY MEDICINE CLINIC | Facility: CLINIC | Age: 79
End: 2019-06-28

## 2019-07-01 NOTE — TELEPHONE ENCOUNTER
Vandana please contact patient and advised that these results must come form Dr. Bowser which ordered the test.  We can not give out another providers test results to patient.

## 2019-07-01 NOTE — TELEPHONE ENCOUNTER
"Talked to patient - she will call their office, she was anxious.  \" this is crazy, yall sent me there why cant your office give them to me \"   "

## 2019-07-02 NOTE — TELEPHONE ENCOUNTER
Fax results came through with a cover page -   That stated from Radiology Please give patient results.

## 2019-07-02 NOTE — TELEPHONE ENCOUNTER
The scanned copy is in her chart , the radiologist is Dr. Jesus Bowser. Please review so that we can contact pt with results.

## 2019-07-02 NOTE — TELEPHONE ENCOUNTER
Pt must receive the results from Dr. ingram as per Dr. Caal, the result we received is for her chart and for reference.

## 2019-07-02 NOTE — TELEPHONE ENCOUNTER
was the radiologist who read and resulted this.   She is saying that we referred her to get this done, and we ordered it.

## 2019-07-23 ENCOUNTER — OFFICE VISIT (OUTPATIENT)
Dept: FAMILY MEDICINE CLINIC | Facility: CLINIC | Age: 79
End: 2019-07-23

## 2019-07-23 VITALS
OXYGEN SATURATION: 96 % | RESPIRATION RATE: 22 BRPM | HEIGHT: 66 IN | WEIGHT: 252.8 LBS | SYSTOLIC BLOOD PRESSURE: 113 MMHG | DIASTOLIC BLOOD PRESSURE: 75 MMHG | HEART RATE: 83 BPM | BODY MASS INDEX: 40.63 KG/M2

## 2019-07-23 DIAGNOSIS — Z85.3 PERSONAL HISTORY OF BREAST CANCER: ICD-10-CM

## 2019-07-23 DIAGNOSIS — L21.9 SEBORRHEIC DERMATITIS OF SCALP: ICD-10-CM

## 2019-07-23 DIAGNOSIS — N63.20 LEFT BREAST MASS: Primary | ICD-10-CM

## 2019-07-23 DIAGNOSIS — K59.09 CHRONIC CONSTIPATION: ICD-10-CM

## 2019-07-23 PROCEDURE — 99213 OFFICE O/P EST LOW 20 MIN: CPT | Performed by: NURSE PRACTITIONER

## 2019-07-23 RX ORDER — CLOBETASOL PROPIONATE 0.46 MG/ML
SOLUTION TOPICAL 2 TIMES DAILY
Qty: 50 ML | Refills: 2 | Status: SHIPPED | OUTPATIENT
Start: 2019-07-23 | End: 2020-06-09 | Stop reason: SDUPTHER

## 2019-07-23 NOTE — PROGRESS NOTES
"Chief Complaint   Patient presents with   • Results     Breast US   • Constipation        Subjective   Paradise Whitman is a 79 y.o.  female who presents today for breast ultrasound results/constipation.    HPI:  BREAST BIOPSY:  Patient recently underwent a breast biopsy of the left breast for a palpable breast mass.  It did not show malignancy but did show necrotic tissue.  She has a history of bilateral breast cancer with bilateral lumpectomy.      CONSTIPATION:  Patient has tried multiple medications.  The Linzess worked \"too well\" and she had to stop it.  She is currently taking senna every other day and that is working well.      Paradise Whitman  has a past medical history of Anxiety, Depression, and Hypertension.    Allergies   Allergen Reactions   • Hydrocodone Swelling   • Macrobid [Nitrofurantoin] Unknown (See Comments)     Ask pt   • Bactrim [Sulfamethoxazole-Trimethoprim] Nausea Only   • Sulfa Antibiotics Nausea Only       Current Outpatient Medications:   •  DIOVAN -12.5 MG per tablet, TAKE ONE TABLET BY MOUTH EVERY DAY, Disp: 30 tablet, Rfl: 5  •  divalproex (DEPAKOTE) 250 MG DR tablet, TAKE ONE TABLET BY MOUTH TWICE A DAY, Disp: 60 tablet, Rfl: 5  •  lidocaine (LIDODERM) 5 %, APPLY ONE PATCH TO EACH KNEE ONCE DAILY, Disp: 60 each, Rfl: 5  •  ondansetron ODT (ZOFRAN ODT) 4 MG disintegrating tablet, Take 1 tablet by mouth Every 8 (Eight) Hours As Needed for Nausea., Disp: 30 tablet, Rfl: 1  •  phenazopyridine (PYRIDIUM) 200 MG tablet, Take 1 tablet by mouth 3 (Three) Times a Day As Needed for bladder spasms., Disp: 10 tablet, Rfl: 0  •  sertraline (ZOLOFT) 100 MG tablet, TAKE ONE AND ONE-HALF TABLETS BY MOUTH EVERY DAY, Disp: 45 tablet, Rfl: 5  •  traMADol (ULTRAM) 50 MG tablet, Take 1 tablet by mouth 3 (Three) Times a Day., Disp: 90 tablet, Rfl: 1  •  clobetasol (TEMOVATE) 0.05 % external solution, Apply  topically to the appropriate area as directed 2 (Two) Times a Day., Disp: 50 mL, Rfl: " 2  Past Medical History:   Diagnosis Date   • Anxiety    • Depression    • Hypertension      Past Surgical History:   Procedure Laterality Date   • BREAST SURGERY     • CHOLECYSTECTOMY     • HYSTERECTOMY       Social History     Socioeconomic History   • Marital status:      Spouse name: Not on file   • Number of children: Not on file   • Years of education: Not on file   • Highest education level: Not on file   Tobacco Use   • Smoking status: Never Smoker   • Smokeless tobacco: Never Used   Substance and Sexual Activity   • Alcohol use: No     Frequency: Never   • Drug use: No   • Sexual activity: Defer     Family History   Problem Relation Age of Onset   • No Known Problems Mother    • No Known Problems Father    • No Known Problems Maternal Grandmother    • No Known Problems Maternal Grandfather    • No Known Problems Paternal Grandmother    • No Known Problems Paternal Grandfather        Family history, surgical history, past medical history, Allergies and med's reviewed with patient today and updated in [facility] EMR.     ROS:  Review of Systems   Constitutional: Negative.  Negative for fatigue, fever and unexpected weight change.   HENT: Negative.  Negative for facial swelling, sore throat and trouble swallowing.    Eyes: Negative.  Negative for photophobia, discharge and visual disturbance.   Respiratory: Negative.  Negative for cough, chest tightness and shortness of breath.    Cardiovascular: Negative.  Negative for chest pain and palpitations.   Gastrointestinal: Positive for constipation. Negative for abdominal pain, diarrhea, nausea and vomiting.   Endocrine: Negative.  Negative for polydipsia, polyphagia and polyuria.   Genitourinary: Negative.  Negative for dysuria, flank pain and frequency.   Musculoskeletal: Positive for arthralgias and back pain. Negative for gait problem and neck pain.   Skin: Negative.  Negative for rash.   Allergic/Immunologic: Negative.    Neurological: Negative.  Negative for  "dizziness, light-headedness and headaches.   Hematological: Negative.    Psychiatric/Behavioral: Negative.  Negative for self-injury and suicidal ideas.       OBJECTIVE:  Vitals:    07/23/19 1101   BP: 113/75   BP Location: Right arm   Patient Position: Sitting   Cuff Size: Adult   Pulse: 83   Resp: 22   SpO2: 96%   Weight: 115 kg (252 lb 12.8 oz)   Height: 167.6 cm (65.98\")     Physical Exam   Constitutional: She is oriented to person, place, and time. She appears well-developed and well-nourished. No distress.   HENT:   Head: Normocephalic and atraumatic.   Eyes: Conjunctivae and EOM are normal. Pupils are equal, round, and reactive to light.   Neck: Normal range of motion. Neck supple.   Cardiovascular: Normal rate, regular rhythm, normal heart sounds and intact distal pulses.   No murmur heard.  Pulmonary/Chest: Effort normal and breath sounds normal. No respiratory distress.   Abdominal: Soft. Bowel sounds are normal. She exhibits no distension. There is no tenderness.   Musculoskeletal: Normal range of motion. She exhibits no edema.   Neurological: She is alert and oriented to person, place, and time.   Skin: Skin is warm and dry. Capillary refill takes less than 2 seconds. She is not diaphoretic. No erythema.   Psychiatric: She has a normal mood and affect. Her behavior is normal. Judgment and thought content normal.   Nursing note and vitals reviewed.      ASSESSMENT/ PLAN:    Paradise was seen today for results and constipation.    Diagnoses and all orders for this visit:    Left breast mass  -     Ambulatory Referral to General Surgery    Personal history of breast cancer  -     Ambulatory Referral to General Surgery    Seborrheic dermatitis of scalp  -     clobetasol (TEMOVATE) 0.05 % external solution; Apply  topically to the appropriate area as directed 2 (Two) Times a Day.    Chronic constipation        Orders Placed Today:     New Medications Ordered This Visit   Medications   • clobetasol (TEMOVATE) " 0.05 % external solution     Sig: Apply  topically to the appropriate area as directed 2 (Two) Times a Day.     Dispense:  50 mL     Refill:  2        Management Plan:     An After Visit Summary was printed and given to the patient at discharge.    Follow-up: Return in about 3 months (around 10/23/2019) for Next scheduled follow up.    Lisa Ruiz, TERESO 7/23/2019 11:41 AM  This note was electronically signed.

## 2019-07-24 RX ORDER — LIDOCAINE 50 MG/G
PATCH TOPICAL
Qty: 60 EACH | Refills: 5 | Status: SHIPPED | OUTPATIENT
Start: 2019-07-24 | End: 2019-10-15 | Stop reason: SDUPTHER

## 2019-08-09 RX ORDER — TRAMADOL HYDROCHLORIDE 50 MG/1
TABLET ORAL
Qty: 90 TABLET | Refills: 1 | Status: SHIPPED | OUTPATIENT
Start: 2019-08-09 | End: 2019-10-15 | Stop reason: SDUPTHER

## 2019-08-21 ENCOUNTER — OFFICE VISIT (OUTPATIENT)
Dept: SURGERY | Age: 79
End: 2019-08-21
Payer: MEDICARE

## 2019-08-21 VITALS
HEIGHT: 66 IN | WEIGHT: 265 LBS | HEART RATE: 84 BPM | SYSTOLIC BLOOD PRESSURE: 122 MMHG | DIASTOLIC BLOOD PRESSURE: 70 MMHG | BODY MASS INDEX: 42.59 KG/M2

## 2019-08-21 DIAGNOSIS — Z98.890 S/P BREAST BIOPSY: ICD-10-CM

## 2019-08-21 DIAGNOSIS — Z85.3 PERSONAL HISTORY OF BREAST CANCER: ICD-10-CM

## 2019-08-21 DIAGNOSIS — R92.8 ABNORMAL MAMMOGRAM: ICD-10-CM

## 2019-08-21 PROCEDURE — 1123F ACP DISCUSS/DSCN MKR DOCD: CPT | Performed by: SURGERY

## 2019-08-21 PROCEDURE — G8400 PT W/DXA NO RESULTS DOC: HCPCS | Performed by: SURGERY

## 2019-08-21 PROCEDURE — 1090F PRES/ABSN URINE INCON ASSESS: CPT | Performed by: SURGERY

## 2019-08-21 PROCEDURE — 4040F PNEUMOC VAC/ADMIN/RCVD: CPT | Performed by: SURGERY

## 2019-08-21 PROCEDURE — G8417 CALC BMI ABV UP PARAM F/U: HCPCS | Performed by: SURGERY

## 2019-08-21 PROCEDURE — 4004F PT TOBACCO SCREEN RCVD TLK: CPT | Performed by: SURGERY

## 2019-08-21 PROCEDURE — G8428 CUR MEDS NOT DOCUMENT: HCPCS | Performed by: SURGERY

## 2019-08-21 PROCEDURE — 99204 OFFICE O/P NEW MOD 45 MIN: CPT | Performed by: SURGERY

## 2019-08-21 RX ORDER — ONDANSETRON 4 MG/1
4 TABLET, ORALLY DISINTEGRATING ORAL
COMMUNITY
Start: 2019-04-26

## 2019-08-21 RX ORDER — DIVALPROEX SODIUM 250 MG/1
TABLET, DELAYED RELEASE ORAL
COMMUNITY
Start: 2019-02-06

## 2019-08-22 RX ORDER — SERTRALINE HYDROCHLORIDE 100 MG/1
TABLET, FILM COATED ORAL
Qty: 45 TABLET | Refills: 5 | Status: SHIPPED | OUTPATIENT
Start: 2019-08-22 | End: 2020-03-02 | Stop reason: SDUPTHER

## 2019-08-22 RX ORDER — DIVALPROEX SODIUM 250 MG/1
TABLET, DELAYED RELEASE ORAL
Qty: 60 TABLET | Refills: 5 | Status: SHIPPED | OUTPATIENT
Start: 2019-08-22 | End: 2020-03-02 | Stop reason: SDUPTHER

## 2019-08-25 PROBLEM — Z98.890 S/P BREAST BIOPSY: Status: ACTIVE | Noted: 2019-08-25

## 2019-08-25 PROBLEM — R92.8 ABNORMAL MAMMOGRAM: Status: ACTIVE | Noted: 2019-08-25

## 2019-08-25 PROBLEM — Z85.3 PERSONAL HISTORY OF BREAST CANCER: Status: ACTIVE | Noted: 2019-08-25

## 2019-09-05 ENCOUNTER — HOSPITAL ENCOUNTER (OUTPATIENT)
Dept: WOMENS IMAGING | Age: 79
Discharge: HOME | End: 2019-09-05
Payer: MEDICARE

## 2019-09-05 DIAGNOSIS — Z85.3 PERSONAL HISTORY OF BREAST CANCER: ICD-10-CM

## 2019-09-05 DIAGNOSIS — N63.20 BREAST MASS, LEFT: ICD-10-CM

## 2019-09-05 PROCEDURE — 88305 TISSUE EXAM BY PATHOLOGIST: CPT

## 2019-09-05 PROCEDURE — 19083 BX BREAST 1ST LESION US IMAG: CPT | Performed by: SURGERY

## 2019-09-05 PROCEDURE — 77065 DX MAMMO INCL CAD UNI: CPT

## 2019-09-05 PROCEDURE — 2709999900 US BREAST BIOPSY W LOC DEVICE 1ST LESION LEFT

## 2019-09-10 ENCOUNTER — TELEPHONE (OUTPATIENT)
Dept: SURGERY | Age: 79
End: 2019-09-10

## 2019-09-20 ENCOUNTER — OFFICE VISIT (OUTPATIENT)
Dept: SURGERY | Age: 79
End: 2019-09-20
Payer: MEDICARE

## 2019-09-20 VITALS — SYSTOLIC BLOOD PRESSURE: 130 MMHG | HEART RATE: 76 BPM | DIASTOLIC BLOOD PRESSURE: 70 MMHG

## 2019-09-20 DIAGNOSIS — Z85.3 PERSONAL HISTORY OF MALIGNANT NEOPLASM OF BREAST: ICD-10-CM

## 2019-09-20 DIAGNOSIS — N64.1 FAT NECROSIS (SEGMENTAL) OF BREAST: Primary | ICD-10-CM

## 2019-09-20 PROCEDURE — G8428 CUR MEDS NOT DOCUMENT: HCPCS | Performed by: PHYSICIAN ASSISTANT

## 2019-09-20 PROCEDURE — 1123F ACP DISCUSS/DSCN MKR DOCD: CPT | Performed by: PHYSICIAN ASSISTANT

## 2019-09-20 PROCEDURE — G8417 CALC BMI ABV UP PARAM F/U: HCPCS | Performed by: PHYSICIAN ASSISTANT

## 2019-09-20 PROCEDURE — 1090F PRES/ABSN URINE INCON ASSESS: CPT | Performed by: PHYSICIAN ASSISTANT

## 2019-09-20 PROCEDURE — 4004F PT TOBACCO SCREEN RCVD TLK: CPT | Performed by: PHYSICIAN ASSISTANT

## 2019-09-20 PROCEDURE — G8400 PT W/DXA NO RESULTS DOC: HCPCS | Performed by: PHYSICIAN ASSISTANT

## 2019-09-20 PROCEDURE — 99213 OFFICE O/P EST LOW 20 MIN: CPT | Performed by: PHYSICIAN ASSISTANT

## 2019-09-20 PROCEDURE — 4040F PNEUMOC VAC/ADMIN/RCVD: CPT | Performed by: PHYSICIAN ASSISTANT

## 2019-09-20 NOTE — Clinical Note
Please let pt know that I reviewed her chart after the visit and she is actually due for bilateral mammograms in May so we will do that in lieu of a 6 month follow up of the left.

## 2019-10-15 ENCOUNTER — OFFICE VISIT (OUTPATIENT)
Dept: FAMILY MEDICINE CLINIC | Facility: CLINIC | Age: 79
End: 2019-10-15

## 2019-10-15 VITALS
HEIGHT: 66 IN | RESPIRATION RATE: 20 BRPM | TEMPERATURE: 98.5 F | DIASTOLIC BLOOD PRESSURE: 86 MMHG | BODY MASS INDEX: 41.05 KG/M2 | WEIGHT: 255.4 LBS | SYSTOLIC BLOOD PRESSURE: 119 MMHG | HEART RATE: 80 BPM | OXYGEN SATURATION: 97 %

## 2019-10-15 DIAGNOSIS — J20.9 ACUTE BRONCHITIS, UNSPECIFIED ORGANISM: Primary | ICD-10-CM

## 2019-10-15 DIAGNOSIS — H66.001 NON-RECURRENT ACUTE SUPPURATIVE OTITIS MEDIA OF RIGHT EAR WITHOUT SPONTANEOUS RUPTURE OF TYMPANIC MEMBRANE: ICD-10-CM

## 2019-10-15 DIAGNOSIS — H65.02 NON-RECURRENT ACUTE SEROUS OTITIS MEDIA OF LEFT EAR: ICD-10-CM

## 2019-10-15 DIAGNOSIS — J04.0 ACUTE LARYNGITIS: ICD-10-CM

## 2019-10-15 PROCEDURE — 96372 THER/PROPH/DIAG INJ SC/IM: CPT | Performed by: NURSE PRACTITIONER

## 2019-10-15 PROCEDURE — 99214 OFFICE O/P EST MOD 30 MIN: CPT | Performed by: NURSE PRACTITIONER

## 2019-10-15 RX ORDER — TRAMADOL HYDROCHLORIDE 50 MG/1
TABLET ORAL
Qty: 90 TABLET | Refills: 1 | Status: SHIPPED | OUTPATIENT
Start: 2019-10-15 | End: 2019-12-27

## 2019-10-15 RX ORDER — AZELASTINE 1 MG/ML
2 SPRAY, METERED NASAL 2 TIMES DAILY
Qty: 1 EACH | Refills: 12 | Status: SHIPPED | OUTPATIENT
Start: 2019-10-15 | End: 2021-04-08 | Stop reason: SDUPTHER

## 2019-10-15 RX ORDER — METHYLPREDNISOLONE ACETATE 40 MG/ML
40 INJECTION, SUSPENSION INTRA-ARTICULAR; INTRALESIONAL; INTRAMUSCULAR; SOFT TISSUE ONCE
Status: COMPLETED | OUTPATIENT
Start: 2019-10-15 | End: 2019-10-15

## 2019-10-15 RX ORDER — BENZONATATE 100 MG/1
100 CAPSULE ORAL 3 TIMES DAILY PRN
Qty: 30 CAPSULE | Refills: 0 | Status: SHIPPED | OUTPATIENT
Start: 2019-10-15 | End: 2020-06-09

## 2019-10-15 RX ORDER — CEFTRIAXONE 500 MG/1
500 INJECTION, POWDER, FOR SOLUTION INTRAMUSCULAR; INTRAVENOUS ONCE
Status: COMPLETED | OUTPATIENT
Start: 2019-10-15 | End: 2019-10-15

## 2019-10-15 RX ORDER — MOMETASONE FUROATE 50 UG/1
2 SPRAY, METERED NASAL DAILY
Qty: 1 EACH | Refills: 12 | Status: SHIPPED | OUTPATIENT
Start: 2019-10-15

## 2019-10-15 RX ORDER — DIVALPROEX SODIUM 250 MG/1
1 TABLET, DELAYED RELEASE ORAL 2 TIMES DAILY
COMMUNITY
Start: 2019-02-06 | End: 2019-10-15 | Stop reason: SDUPTHER

## 2019-10-15 RX ORDER — CEFUROXIME AXETIL 500 MG/1
500 TABLET ORAL 2 TIMES DAILY
Qty: 14 TABLET | Refills: 0 | Status: SHIPPED | OUTPATIENT
Start: 2019-10-15 | End: 2019-10-22

## 2019-10-15 RX ADMIN — CEFTRIAXONE 500 MG: 500 INJECTION, POWDER, FOR SOLUTION INTRAMUSCULAR; INTRAVENOUS at 13:57

## 2019-10-15 RX ADMIN — METHYLPREDNISOLONE ACETATE 40 MG: 40 INJECTION, SUSPENSION INTRA-ARTICULAR; INTRALESIONAL; INTRAMUSCULAR; SOFT TISSUE at 13:58

## 2019-10-15 NOTE — PROGRESS NOTES
"Chief Complaint   Patient presents with   • Eye Drainage   • Cough   • Nasal Congestion        Subjective   Paradise Whitman is a 79 y.o.  female who presents today for URI symptoms.    HPI:  Symptoms started Saturday.  She denies fever.  Coughing, productive, with yellow mucus.  She hears her chest \"crackle\".  She also has nasal congestion.  She is blowing yellow mucus as well.  No sore throat.    Paradise Whitman  has a past medical history of Anxiety, Depression, and Hypertension.    Allergies   Allergen Reactions   • Hydrocodone Swelling   • Macrobid [Nitrofurantoin] Unknown (See Comments)     Ask pt   • Bactrim [Sulfamethoxazole-Trimethoprim] Nausea Only   • Sulfa Antibiotics Nausea Only       Current Outpatient Medications:   •  clobetasol (TEMOVATE) 0.05 % external solution, Apply  topically to the appropriate area as directed 2 (Two) Times a Day., Disp: 50 mL, Rfl: 2  •  DIOVAN -12.5 MG per tablet, TAKE ONE TABLET BY MOUTH EVERY DAY, Disp: 30 tablet, Rfl: 5  •  divalproex (DEPAKOTE) 250 MG DR tablet, TAKE ONE TABLET BY MOUTH TWICE A DAY, Disp: 60 tablet, Rfl: 5  •  lidocaine (LIDODERM) 5 %, APPLY ONE PATCH TO EACH KNEE ONCE DAILY, Disp: 60 each, Rfl: 5  •  ondansetron ODT (ZOFRAN ODT) 4 MG disintegrating tablet, Take 1 tablet by mouth Every 8 (Eight) Hours As Needed for Nausea., Disp: 30 tablet, Rfl: 1  •  sertraline (ZOLOFT) 100 MG tablet, TAKE ONE AND ONE-HALF TABLETS BY MOUTH EVERY DAY, Disp: 45 tablet, Rfl: 5  •  traMADol (ULTRAM) 50 MG tablet, TAKE ONE TABLET BY MOUTH THREE TIMES A DAY, Disp: 90 tablet, Rfl: 1  •  azelastine (ASTELIN) 0.1 % nasal spray, 2 sprays into the nostril(s) as directed by provider 2 (Two) Times a Day. Use in each nostril as directed, Disp: 1 each, Rfl: 12  •  benzonatate (TESSALON PERLES) 100 MG capsule, Take 1 capsule by mouth 3 (Three) Times a Day As Needed for Cough., Disp: 30 capsule, Rfl: 0  •  cefuroxime (CEFTIN) 500 MG tablet, Take 1 tablet by mouth 2 (Two) Times " a Day for 7 days., Disp: 14 tablet, Rfl: 0  •  mometasone (NASONEX) 50 MCG/ACT nasal spray, 2 sprays into the nostril(s) as directed by provider Daily., Disp: 1 each, Rfl: 12  •  mupirocin (BACTROBAN) 2 % ointment, Apply  topically to the appropriate area as directed 2 (Two) Times a Day., Disp: 30 g, Rfl: 2  No current facility-administered medications for this visit.   Past Medical History:   Diagnosis Date   • Anxiety    • Depression    • Hypertension      Past Surgical History:   Procedure Laterality Date   • BREAST SURGERY     • CHOLECYSTECTOMY     • HYSTERECTOMY       Social History     Socioeconomic History   • Marital status:      Spouse name: Not on file   • Number of children: Not on file   • Years of education: Not on file   • Highest education level: Not on file   Tobacco Use   • Smoking status: Never Smoker   • Smokeless tobacco: Never Used   Substance and Sexual Activity   • Alcohol use: No     Frequency: Never   • Drug use: No   • Sexual activity: Defer     Family History   Problem Relation Age of Onset   • No Known Problems Mother    • No Known Problems Father    • No Known Problems Maternal Grandmother    • No Known Problems Maternal Grandfather    • No Known Problems Paternal Grandmother    • No Known Problems Paternal Grandfather        Family history, surgical history, past medical history, Allergies and med's reviewed with patient today and updated in [facility] EMR.     ROS:  Review of Systems   Constitutional: Positive for fatigue. Negative for fever and unexpected weight change.   HENT: Positive for congestion, postnasal drip and voice change. Negative for facial swelling, sore throat and trouble swallowing.    Eyes: Negative.  Negative for photophobia, discharge and visual disturbance.   Respiratory: Positive for cough, shortness of breath and wheezing. Negative for chest tightness.    Cardiovascular: Negative.  Negative for chest pain and palpitations.   Gastrointestinal: Negative.   "Negative for abdominal pain, diarrhea, nausea and vomiting.   Endocrine: Negative.  Negative for polydipsia, polyphagia and polyuria.   Genitourinary: Negative.  Negative for dysuria, flank pain and frequency.   Musculoskeletal: Negative.  Negative for back pain, gait problem and neck pain.   Skin: Negative.  Negative for rash.   Allergic/Immunologic: Negative.    Neurological: Positive for headaches. Negative for dizziness and light-headedness.   Hematological: Negative.    Psychiatric/Behavioral: Negative.  Negative for self-injury and suicidal ideas.       OBJECTIVE:  Vitals:    10/15/19 1314   BP: 119/86   BP Location: Right arm   Patient Position: Sitting   Cuff Size: Adult   Pulse: 80   Resp: 20   Temp: 98.5 °F (36.9 °C)   TempSrc: Temporal   SpO2: 97%   Weight: 116 kg (255 lb 6.4 oz)   Height: 167.6 cm (65.98\")     Physical Exam   Constitutional: She is oriented to person, place, and time. She appears well-developed and well-nourished. No distress.   HENT:   Head: Normocephalic and atraumatic.   Right Ear: External ear normal.   Left Ear: External ear normal.   Nose: Nose normal.   Right TM with suppurative effusion and erythematous membrane.  Oropharynx erythematous with evidence of post nasal drainage.  Voice is hoarse.   Eyes: Conjunctivae and EOM are normal. Pupils are equal, round, and reactive to light.   Neck: Normal range of motion. Neck supple.   Cardiovascular: Normal rate, regular rhythm, normal heart sounds and intact distal pulses.   No murmur heard.  Pulmonary/Chest: Effort normal. No respiratory distress. She has wheezes.   Mild expiratory wheezes present throughout the lung fields.   Abdominal: Soft. Bowel sounds are normal. She exhibits no distension. There is no tenderness.   Musculoskeletal: Normal range of motion. She exhibits no edema.   Neurological: She is alert and oriented to person, place, and time.   Skin: Skin is warm and dry. Capillary refill takes less than 2 seconds. She is not " diaphoretic. No erythema.   Psychiatric: She has a normal mood and affect. Her behavior is normal. Judgment and thought content normal.   Nursing note and vitals reviewed.      ASSESSMENT/ PLAN:    Paradise was seen today for eye drainage, cough and nasal congestion.    Diagnoses and all orders for this visit:    Acute bronchitis, unspecified organism  -     cefTRIAXone (ROCEPHIN) injection 500 mg  -     methylPREDNISolone acetate (DEPO-medrol) injection 40 mg  -     cefuroxime (CEFTIN) 500 MG tablet; Take 1 tablet by mouth 2 (Two) Times a Day for 7 days.  -     benzonatate (TESSALON PERLES) 100 MG capsule; Take 1 capsule by mouth 3 (Three) Times a Day As Needed for Cough.    Non-recurrent acute suppurative otitis media of right ear without spontaneous rupture of tympanic membrane  -     cefTRIAXone (ROCEPHIN) injection 500 mg  -     methylPREDNISolone acetate (DEPO-medrol) injection 40 mg  -     cefuroxime (CEFTIN) 500 MG tablet; Take 1 tablet by mouth 2 (Two) Times a Day for 7 days.    Non-recurrent acute serous otitis media of left ear  -     mometasone (NASONEX) 50 MCG/ACT nasal spray; 2 sprays into the nostril(s) as directed by provider Daily.  -     azelastine (ASTELIN) 0.1 % nasal spray; 2 sprays into the nostril(s) as directed by provider 2 (Two) Times a Day. Use in each nostril as directed    Acute laryngitis  -     methylPREDNISolone acetate (DEPO-medrol) injection 40 mg  -     cefuroxime (CEFTIN) 500 MG tablet; Take 1 tablet by mouth 2 (Two) Times a Day for 7 days.    Other orders  -     mupirocin (BACTROBAN) 2 % ointment; Apply  topically to the appropriate area as directed 2 (Two) Times a Day.        Orders Placed Today:     New Medications Ordered This Visit   Medications   • mometasone (NASONEX) 50 MCG/ACT nasal spray     Si sprays into the nostril(s) as directed by provider Daily.     Dispense:  1 each     Refill:  12   • azelastine (ASTELIN) 0.1 % nasal spray     Si sprays into the nostril(s)  as directed by provider 2 (Two) Times a Day. Use in each nostril as directed     Dispense:  1 each     Refill:  12   • mupirocin (BACTROBAN) 2 % ointment     Sig: Apply  topically to the appropriate area as directed 2 (Two) Times a Day.     Dispense:  30 g     Refill:  2   • cefTRIAXone (ROCEPHIN) injection 500 mg   • methylPREDNISolone acetate (DEPO-medrol) injection 40 mg   • cefuroxime (CEFTIN) 500 MG tablet     Sig: Take 1 tablet by mouth 2 (Two) Times a Day for 7 days.     Dispense:  14 tablet     Refill:  0   • benzonatate (TESSALON PERLES) 100 MG capsule     Sig: Take 1 capsule by mouth 3 (Three) Times a Day As Needed for Cough.     Dispense:  30 capsule     Refill:  0        Management Plan:     An After Visit Summary was printed and given to the patient at discharge.    Follow-up: Return if symptoms worsen or fail to improve.    TERESO Coon 10/15/2019 2:10 PM  This note was electronically signed.

## 2019-10-22 ENCOUNTER — OFFICE VISIT (OUTPATIENT)
Dept: FAMILY MEDICINE CLINIC | Facility: CLINIC | Age: 79
End: 2019-10-22

## 2019-10-22 ENCOUNTER — HOSPITAL ENCOUNTER (OUTPATIENT)
Dept: GENERAL RADIOLOGY | Facility: HOSPITAL | Age: 79
Discharge: HOME | End: 2019-10-22
Admitting: NURSE PRACTITIONER

## 2019-10-22 VITALS
HEIGHT: 66 IN | WEIGHT: 254.2 LBS | RESPIRATION RATE: 22 BRPM | HEART RATE: 77 BPM | SYSTOLIC BLOOD PRESSURE: 128 MMHG | TEMPERATURE: 98.8 F | DIASTOLIC BLOOD PRESSURE: 88 MMHG | OXYGEN SATURATION: 99 % | BODY MASS INDEX: 40.85 KG/M2

## 2019-10-22 DIAGNOSIS — R53.83 FATIGUE, UNSPECIFIED TYPE: ICD-10-CM

## 2019-10-22 DIAGNOSIS — R05.8 PRODUCTIVE COUGH: ICD-10-CM

## 2019-10-22 DIAGNOSIS — J20.9 ACUTE BRONCHITIS, UNSPECIFIED ORGANISM: Primary | ICD-10-CM

## 2019-10-22 DIAGNOSIS — J20.9 ACUTE BRONCHITIS, UNSPECIFIED ORGANISM: ICD-10-CM

## 2019-10-22 PROCEDURE — 99213 OFFICE O/P EST LOW 20 MIN: CPT | Performed by: NURSE PRACTITIONER

## 2019-10-22 PROCEDURE — 71046 X-RAY EXAM CHEST 2 VIEWS: CPT

## 2019-10-22 RX ORDER — LEVOFLOXACIN 500 MG/1
500 TABLET, FILM COATED ORAL DAILY
Qty: 10 TABLET | Refills: 0 | Status: SHIPPED | OUTPATIENT
Start: 2019-10-22 | End: 2019-12-12

## 2019-10-22 NOTE — PROGRESS NOTES
"Chief Complaint   Patient presents with   • Bronchitis     Pt states not any better from last week        Subjective   Paradise Whitman is a 79 y.o.  female who presents today for follow up bronchitis.    HPI:  BRONCHITIS:  She was seen and treated last week.  She states she is \"somewhat better\" but is still having a significant productive cough.  She has body aches.  No fever.  She states her ears are still full and she has dizziness and trouble hearing.    Paradise Whitman  has a past medical history of Anxiety, Depression, and Hypertension.    Allergies   Allergen Reactions   • Hydrocodone Swelling   • Macrobid [Nitrofurantoin] Unknown (See Comments)     Ask pt   • Bactrim [Sulfamethoxazole-Trimethoprim] Nausea Only   • Sulfa Antibiotics Nausea Only       Current Outpatient Medications:   •  azelastine (ASTELIN) 0.1 % nasal spray, 2 sprays into the nostril(s) as directed by provider 2 (Two) Times a Day. Use in each nostril as directed, Disp: 1 each, Rfl: 12  •  benzonatate (TESSALON PERLES) 100 MG capsule, Take 1 capsule by mouth 3 (Three) Times a Day As Needed for Cough., Disp: 30 capsule, Rfl: 0  •  clobetasol (TEMOVATE) 0.05 % external solution, Apply  topically to the appropriate area as directed 2 (Two) Times a Day., Disp: 50 mL, Rfl: 2  •  DIOVAN -12.5 MG per tablet, TAKE ONE TABLET BY MOUTH EVERY DAY, Disp: 30 tablet, Rfl: 5  •  divalproex (DEPAKOTE) 250 MG DR tablet, TAKE ONE TABLET BY MOUTH TWICE A DAY, Disp: 60 tablet, Rfl: 5  •  lidocaine (LIDODERM) 5 %, APPLY ONE PATCH TO EACH KNEE ONCE DAILY, Disp: 60 each, Rfl: 5  •  mometasone (NASONEX) 50 MCG/ACT nasal spray, 2 sprays into the nostril(s) as directed by provider Daily., Disp: 1 each, Rfl: 12  •  mupirocin (BACTROBAN) 2 % ointment, Apply  topically to the appropriate area as directed 2 (Two) Times a Day., Disp: 30 g, Rfl: 2  •  ondansetron ODT (ZOFRAN ODT) 4 MG disintegrating tablet, Take 1 tablet by mouth Every 8 (Eight) Hours As Needed for " Nausea., Disp: 30 tablet, Rfl: 1  •  sertraline (ZOLOFT) 100 MG tablet, TAKE ONE AND ONE-HALF TABLETS BY MOUTH EVERY DAY, Disp: 45 tablet, Rfl: 5  •  traMADol (ULTRAM) 50 MG tablet, TAKE ONE TABLET BY MOUTH THREE TIMES A DAY, Disp: 90 tablet, Rfl: 1  •  levoFLOXacin (LEVAQUIN) 500 MG tablet, Take 1 tablet by mouth Daily., Disp: 10 tablet, Rfl: 0  Past Medical History:   Diagnosis Date   • Anxiety    • Depression    • Hypertension      Past Surgical History:   Procedure Laterality Date   • BREAST SURGERY     • CHOLECYSTECTOMY     • HYSTERECTOMY       Social History     Socioeconomic History   • Marital status:      Spouse name: Not on file   • Number of children: Not on file   • Years of education: Not on file   • Highest education level: Not on file   Tobacco Use   • Smoking status: Never Smoker   • Smokeless tobacco: Never Used   Substance and Sexual Activity   • Alcohol use: No     Frequency: Never   • Drug use: No   • Sexual activity: Defer     Family History   Problem Relation Age of Onset   • No Known Problems Mother    • No Known Problems Father    • No Known Problems Maternal Grandmother    • No Known Problems Maternal Grandfather    • No Known Problems Paternal Grandmother    • No Known Problems Paternal Grandfather        Family history, surgical history, past medical history, Allergies and med's reviewed with patient today and updated in [facility] EMR.     ROS:  Review of Systems   Constitutional: Positive for fatigue. Negative for fever and unexpected weight change.   HENT: Positive for ear pain. Negative for facial swelling, sore throat and trouble swallowing.    Eyes: Negative.  Negative for photophobia, discharge and visual disturbance.   Respiratory: Positive for cough. Negative for chest tightness and shortness of breath.    Cardiovascular: Negative.  Negative for chest pain and palpitations.   Gastrointestinal: Negative.  Negative for abdominal pain, diarrhea, nausea and vomiting.   Endocrine:  "Negative.  Negative for polydipsia, polyphagia and polyuria.   Genitourinary: Negative.  Negative for dysuria, flank pain and frequency.   Musculoskeletal: Negative.  Negative for back pain, gait problem and neck pain.   Skin: Negative.  Negative for rash.   Allergic/Immunologic: Negative.    Neurological: Positive for dizziness. Negative for light-headedness and headaches.   Hematological: Negative.    Psychiatric/Behavioral: Negative.  Negative for self-injury and suicidal ideas.       OBJECTIVE:  Vitals:    10/22/19 1135   BP: 128/88   BP Location: Right arm   Patient Position: Sitting   Cuff Size: Adult   Pulse: 77   Resp: 22   Temp: 98.8 °F (37.1 °C)   TempSrc: Temporal   SpO2: 99%   Weight: 115 kg (254 lb 3.2 oz)   Height: 167.6 cm (65.98\")     Physical Exam   Constitutional: She is oriented to person, place, and time. She appears well-developed and well-nourished. No distress.   HENT:   Head: Normocephalic and atraumatic.   Right Ear: External ear normal.   Left Ear: External ear normal.   Nose: Nose normal.   Mouth/Throat: Oropharynx is clear and moist.   Mild to moderate serous effusions bilaterally (improvement noted from last visit).   Eyes: Conjunctivae and EOM are normal. Pupils are equal, round, and reactive to light.   Neck: Normal range of motion. Neck supple.   Cardiovascular: Normal rate, regular rhythm, normal heart sounds and intact distal pulses.   No murmur heard.  Pulmonary/Chest: Effort normal and breath sounds normal. No respiratory distress.   Diminished breath sounds bilaterally.   Abdominal: Soft. Bowel sounds are normal. She exhibits no distension. There is no tenderness.   Musculoskeletal: Normal range of motion. She exhibits no edema.   Neurological: She is alert and oriented to person, place, and time.   Skin: Skin is warm and dry. Capillary refill takes less than 2 seconds. She is not diaphoretic. No erythema.   Psychiatric: She has a normal mood and affect. Her behavior is normal. " Judgment and thought content normal.   Nursing note and vitals reviewed.      ASSESSMENT/ PLAN:    Paradise was seen today for bronchitis.    Diagnoses and all orders for this visit:    Acute bronchitis, unspecified organism  -     XR Chest PA & Lateral; Future  -     levoFLOXacin (LEVAQUIN) 500 MG tablet; Take 1 tablet by mouth Daily.    Productive cough  -     XR Chest PA & Lateral; Future    Fatigue, unspecified type        Orders Placed Today:     New Medications Ordered This Visit   Medications   • levoFLOXacin (LEVAQUIN) 500 MG tablet     Sig: Take 1 tablet by mouth Daily.     Dispense:  10 tablet     Refill:  0        Management Plan:     An After Visit Summary was printed and given to the patient at discharge.    Follow-up: Return if symptoms worsen or fail to improve.    Lisa Ruiz, APRN 10/31/2019 4:32 PM  This note was electronically signed.

## 2019-11-05 RX ORDER — VALSARTAN/HYDROCHLOROTHIAZIDE 160-12.5MG
TABLET ORAL
Qty: 90 TABLET | Refills: 1 | Status: SHIPPED | OUTPATIENT
Start: 2019-11-05 | End: 2020-03-02 | Stop reason: SDUPTHER

## 2019-11-21 ENCOUNTER — CLINICAL SUPPORT (OUTPATIENT)
Dept: FAMILY MEDICINE CLINIC | Facility: CLINIC | Age: 79
End: 2019-11-21

## 2019-11-21 DIAGNOSIS — Z23 NEED FOR IMMUNIZATION AGAINST INFLUENZA: Primary | ICD-10-CM

## 2019-11-21 DIAGNOSIS — Z79.899 ENCOUNTER FOR LONG-TERM (CURRENT) USE OF HIGH-RISK MEDICATION: ICD-10-CM

## 2019-11-21 DIAGNOSIS — I10 ESSENTIAL HYPERTENSION: ICD-10-CM

## 2019-11-21 PROBLEM — Z85.3 PERSONAL HISTORY OF BREAST CANCER: Status: ACTIVE | Noted: 2019-08-25

## 2019-11-21 PROBLEM — R92.8 ABNORMAL MAMMOGRAM: Status: ACTIVE | Noted: 2019-08-25

## 2019-11-21 PROBLEM — Z98.890 S/P BREAST BIOPSY: Status: ACTIVE | Noted: 2019-08-25

## 2019-11-21 PROCEDURE — 90653 IIV ADJUVANT VACCINE IM: CPT | Performed by: NURSE PRACTITIONER

## 2019-11-21 PROCEDURE — G0008 ADMIN INFLUENZA VIRUS VAC: HCPCS | Performed by: NURSE PRACTITIONER

## 2019-11-22 LAB
ALBUMIN SERPL-MCNC: 3.9 G/DL (ref 3.5–4.7)
ALBUMIN/GLOB SERPL: 1.6 {RATIO} (ref 1.2–2.2)
ALP SERPL-CCNC: 55 IU/L (ref 39–117)
ALT SERPL-CCNC: 12 IU/L (ref 0–32)
AST SERPL-CCNC: 24 IU/L (ref 0–40)
BASOPHILS # BLD AUTO: 0 X10E3/UL (ref 0–0.2)
BASOPHILS NFR BLD AUTO: 1 %
BILIRUB SERPL-MCNC: 0.6 MG/DL (ref 0–1.2)
BUN SERPL-MCNC: 21 MG/DL (ref 8–27)
BUN/CREAT SERPL: 30 (ref 12–28)
CALCIUM SERPL-MCNC: 9.1 MG/DL (ref 8.7–10.3)
CHLORIDE SERPL-SCNC: 100 MMOL/L (ref 96–106)
CHOLEST SERPL-MCNC: 216 MG/DL (ref 100–199)
CO2 SERPL-SCNC: 26 MMOL/L (ref 20–29)
CREAT SERPL-MCNC: 0.7 MG/DL (ref 0.57–1)
EOSINOPHIL # BLD AUTO: 0.2 X10E3/UL (ref 0–0.4)
EOSINOPHIL NFR BLD AUTO: 3 %
ERYTHROCYTE [DISTWIDTH] IN BLOOD BY AUTOMATED COUNT: 13.7 % (ref 12.3–15.4)
GLOBULIN SER CALC-MCNC: 2.4 G/DL (ref 1.5–4.5)
GLUCOSE SERPL-MCNC: 100 MG/DL (ref 65–99)
HCT VFR BLD AUTO: 39.5 % (ref 34–46.6)
HDLC SERPL-MCNC: 69 MG/DL
HGB BLD-MCNC: 13.5 G/DL (ref 11.1–15.9)
IMM GRANULOCYTES # BLD AUTO: 0 X10E3/UL (ref 0–0.1)
IMM GRANULOCYTES NFR BLD AUTO: 0 %
LDLC SERPL CALC-MCNC: 130 MG/DL (ref 0–99)
LDLC/HDLC SERPL: 1.9 RATIO (ref 0–3.2)
LYMPHOCYTES # BLD AUTO: 0.8 X10E3/UL (ref 0.7–3.1)
LYMPHOCYTES NFR BLD AUTO: 15 %
MCH RBC QN AUTO: 32.7 PG (ref 26.6–33)
MCHC RBC AUTO-ENTMCNC: 34.2 G/DL (ref 31.5–35.7)
MCV RBC AUTO: 96 FL (ref 79–97)
MONOCYTES # BLD AUTO: 0.4 X10E3/UL (ref 0.1–0.9)
MONOCYTES NFR BLD AUTO: 9 %
NEUTROPHILS # BLD AUTO: 3.8 X10E3/UL (ref 1.4–7)
NEUTROPHILS NFR BLD AUTO: 72 %
PLATELET # BLD AUTO: 235 X10E3/UL (ref 150–450)
POTASSIUM SERPL-SCNC: 4.7 MMOL/L (ref 3.5–5.2)
PROT SERPL-MCNC: 6.3 G/DL (ref 6–8.5)
RBC # BLD AUTO: 4.13 X10E6/UL (ref 3.77–5.28)
SODIUM SERPL-SCNC: 141 MMOL/L (ref 134–144)
TRIGL SERPL-MCNC: 86 MG/DL (ref 0–149)
VALPROATE SERPL-MCNC: 6 UG/ML (ref 50–100)
VLDLC SERPL CALC-MCNC: 17 MG/DL (ref 5–40)
WBC # BLD AUTO: 5.2 X10E3/UL (ref 3.4–10.8)

## 2019-12-12 ENCOUNTER — OFFICE VISIT (OUTPATIENT)
Dept: FAMILY MEDICINE CLINIC | Facility: CLINIC | Age: 79
End: 2019-12-12

## 2019-12-12 VITALS
WEIGHT: 255 LBS | HEART RATE: 80 BPM | RESPIRATION RATE: 20 BRPM | HEIGHT: 66 IN | DIASTOLIC BLOOD PRESSURE: 85 MMHG | OXYGEN SATURATION: 95 % | BODY MASS INDEX: 40.98 KG/M2 | SYSTOLIC BLOOD PRESSURE: 129 MMHG

## 2019-12-12 DIAGNOSIS — R09.81 CONGESTION OF NASAL SINUS: ICD-10-CM

## 2019-12-12 DIAGNOSIS — M25.561 CHRONIC PAIN OF BOTH KNEES: ICD-10-CM

## 2019-12-12 DIAGNOSIS — J01.00 ACUTE NON-RECURRENT MAXILLARY SINUSITIS: Primary | ICD-10-CM

## 2019-12-12 DIAGNOSIS — G89.29 CHRONIC PAIN OF BOTH KNEES: ICD-10-CM

## 2019-12-12 DIAGNOSIS — M25.562 CHRONIC PAIN OF BOTH KNEES: ICD-10-CM

## 2019-12-12 PROCEDURE — 96372 THER/PROPH/DIAG INJ SC/IM: CPT | Performed by: NURSE PRACTITIONER

## 2019-12-12 PROCEDURE — 99213 OFFICE O/P EST LOW 20 MIN: CPT | Performed by: NURSE PRACTITIONER

## 2019-12-12 RX ORDER — AMOXICILLIN AND CLAVULANATE POTASSIUM 875; 125 MG/1; MG/1
1 TABLET, FILM COATED ORAL 2 TIMES DAILY
Qty: 20 TABLET | Refills: 0 | Status: SHIPPED | OUTPATIENT
Start: 2019-12-12 | End: 2020-06-09

## 2019-12-12 RX ORDER — METHYLPREDNISOLONE ACETATE 40 MG/ML
40 INJECTION, SUSPENSION INTRA-ARTICULAR; INTRALESIONAL; INTRAMUSCULAR; SOFT TISSUE ONCE
Status: COMPLETED | OUTPATIENT
Start: 2019-12-12 | End: 2019-12-12

## 2019-12-12 RX ADMIN — METHYLPREDNISOLONE ACETATE 40 MG: 40 INJECTION, SUSPENSION INTRA-ARTICULAR; INTRALESIONAL; INTRAMUSCULAR; SOFT TISSUE at 14:15

## 2019-12-12 NOTE — PROGRESS NOTES
Chief Complaint   Patient presents with   • Nasal Congestion   • Results     discuss recent labs        Subjective   Paradise Whitman is a 79 y.o.  female who presents today for nasal congestion and for lab discussion.    HPI: (Patient's  passed away on 11/26/2019)  NASAL CONGESTION:  This is a chronic problem but over the past few days, she has increased pressure in the left frontal and maxillary sinuses.This has been worse every single day since it started.  LAB RESULTS:  Patient has questions regarding the labs that were drawn in mid November prior to his 's passing.  She is feeling well except for the congestion.    Paradise Whitman  has a past medical history of Anxiety, Depression, and Hypertension.    Allergies   Allergen Reactions   • Hydrocodone Swelling   • Macrobid [Nitrofurantoin] Unknown (See Comments)     Ask pt   • Bactrim [Sulfamethoxazole-Trimethoprim] Nausea Only   • Sulfa Antibiotics Nausea Only       Current Outpatient Medications:   •  azelastine (ASTELIN) 0.1 % nasal spray, 2 sprays into the nostril(s) as directed by provider 2 (Two) Times a Day. Use in each nostril as directed, Disp: 1 each, Rfl: 12  •  benzonatate (TESSALON PERLES) 100 MG capsule, Take 1 capsule by mouth 3 (Three) Times a Day As Needed for Cough., Disp: 30 capsule, Rfl: 0  •  clobetasol (TEMOVATE) 0.05 % external solution, Apply  topically to the appropriate area as directed 2 (Two) Times a Day., Disp: 50 mL, Rfl: 2  •  DIOVAN -12.5 MG per tablet, TAKE ONE TABLET BY MOUTH EVERY DAY, Disp: 90 tablet, Rfl: 1  •  divalproex (DEPAKOTE) 250 MG DR tablet, TAKE ONE TABLET BY MOUTH TWICE A DAY, Disp: 60 tablet, Rfl: 5  •  lidocaine (LIDODERM) 5 %, APPLY ONE PATCH TO EACH KNEE ONCE DAILY, Disp: 60 each, Rfl: 5  •  mometasone (NASONEX) 50 MCG/ACT nasal spray, 2 sprays into the nostril(s) as directed by provider Daily., Disp: 1 each, Rfl: 12  •  mupirocin (BACTROBAN) 2 % ointment, Apply  topically to the  appropriate area as directed 2 (Two) Times a Day., Disp: 30 g, Rfl: 2  •  ondansetron ODT (ZOFRAN ODT) 4 MG disintegrating tablet, Take 1 tablet by mouth Every 8 (Eight) Hours As Needed for Nausea., Disp: 30 tablet, Rfl: 1  •  sertraline (ZOLOFT) 100 MG tablet, TAKE ONE AND ONE-HALF TABLETS BY MOUTH EVERY DAY, Disp: 45 tablet, Rfl: 5  •  traMADol (ULTRAM) 50 MG tablet, TAKE ONE TABLET BY MOUTH THREE TIMES A DAY, Disp: 90 tablet, Rfl: 1  •  amoxicillin-clavulanate (AUGMENTIN) 875-125 MG per tablet, Take 1 tablet by mouth 2 (Two) Times a Day., Disp: 20 tablet, Rfl: 0  No current facility-administered medications for this visit.   Past Medical History:   Diagnosis Date   • Anxiety    • Depression    • Hypertension      Past Surgical History:   Procedure Laterality Date   • BREAST SURGERY     • CHOLECYSTECTOMY     • HYSTERECTOMY       Social History     Socioeconomic History   • Marital status:      Spouse name: Not on file   • Number of children: Not on file   • Years of education: Not on file   • Highest education level: Not on file   Tobacco Use   • Smoking status: Never Smoker   • Smokeless tobacco: Never Used   Substance and Sexual Activity   • Alcohol use: No     Frequency: Never   • Drug use: No   • Sexual activity: Defer     Family History   Problem Relation Age of Onset   • No Known Problems Mother    • No Known Problems Father    • No Known Problems Maternal Grandmother    • No Known Problems Maternal Grandfather    • No Known Problems Paternal Grandmother    • No Known Problems Paternal Grandfather        Family history, surgical history, past medical history, Allergies and med's reviewed with patient today and updated in [employer] EMR.     ROS:  Review of Systems   Constitutional: Negative.  Negative for fatigue, fever and unexpected weight change.   HENT: Positive for congestion, sinus pressure and sinus pain. Negative for facial swelling, sore throat and trouble swallowing.    Eyes: Negative.  Negative  "for photophobia, discharge and visual disturbance.   Respiratory: Negative.  Negative for cough, chest tightness and shortness of breath.    Cardiovascular: Negative.  Negative for chest pain and palpitations.   Gastrointestinal: Negative.  Negative for abdominal pain, diarrhea, nausea and vomiting.   Endocrine: Negative.  Negative for polydipsia, polyphagia and polyuria.   Genitourinary: Negative.  Negative for dysuria, flank pain and frequency.   Musculoskeletal: Positive for arthralgias. Negative for back pain, gait problem and neck pain.        Bilateral knee pain (chronic)   Skin: Negative.  Negative for rash.   Allergic/Immunologic: Negative.    Neurological: Negative.  Negative for dizziness, light-headedness and headaches.   Hematological: Negative.    Psychiatric/Behavioral: Negative.  Negative for self-injury and suicidal ideas.       OBJECTIVE:  Vitals:    12/12/19 1331   BP: 129/85   BP Location: Right arm   Patient Position: Sitting   Cuff Size: Large Adult   Pulse: 80   Resp: 20   SpO2: 95%   Weight: 116 kg (255 lb)   Height: 167.6 cm (65.98\")     Physical Exam   Constitutional: She is oriented to person, place, and time. She appears well-developed and well-nourished. No distress.   HENT:   Head: Normocephalic and atraumatic.   Right Ear: External ear normal.   Left Ear: External ear normal.   Evidence of post nasal drainage.  Tenderness to frontal and maxillary sinuses, left greater than right.   Eyes: Pupils are equal, round, and reactive to light. Conjunctivae and EOM are normal.   Neck: Normal range of motion. Neck supple.   Cardiovascular: Normal rate, regular rhythm, normal heart sounds and intact distal pulses.   No murmur heard.  Pulmonary/Chest: Effort normal and breath sounds normal. No respiratory distress.   Abdominal: Soft. Bowel sounds are normal. She exhibits no distension. There is no tenderness.   Musculoskeletal: Normal range of motion. She exhibits tenderness. She exhibits no edema. "   Bilateral knee crepitance and tenderness with ROM.   Neurological: She is alert and oriented to person, place, and time.   Skin: Skin is warm and dry. Capillary refill takes less than 2 seconds. She is not diaphoretic. No erythema.   Psychiatric: She has a normal mood and affect. Her behavior is normal. Judgment and thought content normal.   Nursing note and vitals reviewed.      ASSESSMENT/ PLAN:    Paradise was seen today for nasal congestion and results.    Diagnoses and all orders for this visit:    Acute non-recurrent maxillary sinusitis  -     amoxicillin-clavulanate (AUGMENTIN) 875-125 MG per tablet; Take 1 tablet by mouth 2 (Two) Times a Day.  -     methylPREDNISolone acetate (DEPO-medrol) injection 40 mg    Congestion of nasal sinus  -     amoxicillin-clavulanate (AUGMENTIN) 875-125 MG per tablet; Take 1 tablet by mouth 2 (Two) Times a Day.  -     methylPREDNISolone acetate (DEPO-medrol) injection 40 mg    Chronic pain of both knees  -     methylPREDNISolone acetate (DEPO-medrol) injection 40 mg        Orders Placed Today:     New Medications Ordered This Visit   Medications   • amoxicillin-clavulanate (AUGMENTIN) 875-125 MG per tablet     Sig: Take 1 tablet by mouth 2 (Two) Times a Day.     Dispense:  20 tablet     Refill:  0   • methylPREDNISolone acetate (DEPO-medrol) injection 40 mg        Management Plan:     An After Visit Summary was printed and given to the patient at discharge.    Follow-up: Return in about 3 months (around 3/12/2020) for Next scheduled follow up.    TERESO Coon 12/12/2019 5:19 PM  This note was electronically signed.

## 2019-12-26 DIAGNOSIS — M25.562 CHRONIC PAIN OF BOTH KNEES: Primary | ICD-10-CM

## 2019-12-26 DIAGNOSIS — G89.29 CHRONIC PAIN OF BOTH KNEES: Primary | ICD-10-CM

## 2019-12-26 DIAGNOSIS — M25.561 CHRONIC PAIN OF BOTH KNEES: Primary | ICD-10-CM

## 2019-12-27 RX ORDER — TRAMADOL HYDROCHLORIDE 50 MG/1
TABLET ORAL
Qty: 90 TABLET | Refills: 1 | Status: SHIPPED | OUTPATIENT
Start: 2019-12-27 | End: 2020-03-02 | Stop reason: SDUPTHER

## 2020-02-12 ENCOUNTER — TELEPHONE (OUTPATIENT)
Dept: FAMILY MEDICINE CLINIC | Facility: CLINIC | Age: 80
End: 2020-02-12

## 2020-02-12 NOTE — TELEPHONE ENCOUNTER
I called and spoke with [facility] at [phone] in [facility] to PA for Lidocaine 5% Patches. They were denied thorugh Patient's medicare prescription drug plan but was approved through Patient's enhanced benefit plan. They were approved for a whole year. Case# [id number].     I have contacted Pt and made her aware of the approval and she voiced understanding.  I also contacted Cory to make sure they received the approval and Karo confirmed they did and Pt did .

## 2020-03-02 DIAGNOSIS — G89.29 CHRONIC PAIN OF BOTH KNEES: ICD-10-CM

## 2020-03-02 DIAGNOSIS — M25.561 CHRONIC PAIN OF BOTH KNEES: ICD-10-CM

## 2020-03-02 DIAGNOSIS — M25.562 CHRONIC PAIN OF BOTH KNEES: ICD-10-CM

## 2020-03-02 RX ORDER — SERTRALINE HYDROCHLORIDE 100 MG/1
150 TABLET, FILM COATED ORAL DAILY
Qty: 135 TABLET | Refills: 1 | Status: SHIPPED | OUTPATIENT
Start: 2020-03-02 | End: 2020-09-04

## 2020-03-02 RX ORDER — DIVALPROEX SODIUM 250 MG/1
TABLET, DELAYED RELEASE ORAL
Qty: 60 TABLET | Refills: 5 | OUTPATIENT
Start: 2020-03-02

## 2020-03-02 RX ORDER — TRAMADOL HYDROCHLORIDE 50 MG/1
50 TABLET ORAL 3 TIMES DAILY
Qty: 270 TABLET | Refills: 0 | Status: SHIPPED | OUTPATIENT
Start: 2020-03-02 | End: 2020-06-09 | Stop reason: SDUPTHER

## 2020-03-02 RX ORDER — VALSARTAN AND HYDROCHLOROTHIAZIDE 160; 12.5 MG/1; MG/1
1 TABLET, FILM COATED ORAL DAILY
Qty: 90 TABLET | Refills: 1 | Status: SHIPPED | OUTPATIENT
Start: 2020-03-02 | End: 2020-10-27 | Stop reason: SDUPTHER

## 2020-03-02 RX ORDER — SERTRALINE HYDROCHLORIDE 100 MG/1
TABLET, FILM COATED ORAL
Qty: 45 TABLET | Refills: 5 | OUTPATIENT
Start: 2020-03-02

## 2020-03-02 RX ORDER — DIVALPROEX SODIUM 250 MG/1
250 TABLET, DELAYED RELEASE ORAL 2 TIMES DAILY
Qty: 180 TABLET | Refills: 1 | Status: SHIPPED | OUTPATIENT
Start: 2020-03-02 | End: 2020-10-27 | Stop reason: SDUPTHER

## 2020-03-02 NOTE — TELEPHONE ENCOUNTER
PT called to request a refill on the following medication, states she's completely out: sertraline (ZOLOFT) 100 MG tablet.     PT is also wondering if it would be possible to fill all of her medications for 3 months at once. States she has severe knee pain and it's really difficult for her to get out every month to  medications from pharmacy. I advised PT that at least one of her medications is on the controlled substance list so it may not be possible, stated understanding. Essentially PT is asking for quantity to be increased to 3 month supply. I've attached all medications PT would like the 3 month supply for.     Confirmed Pharmacy: [facility] - [address] - [phone] [facility] [phone]     PT is requesting a call back: [phone]

## 2020-03-10 ENCOUNTER — TRANSCRIBE ORDERS (OUTPATIENT)
Dept: ADMINISTRATIVE | Facility: HOSPITAL | Age: 80
End: 2020-03-10

## 2020-03-10 DIAGNOSIS — Z12.31 ENCOUNTER FOR SCREENING MAMMOGRAM FOR MALIGNANT NEOPLASM OF BREAST: Primary | ICD-10-CM

## 2020-03-10 DIAGNOSIS — Z85.3 HISTORY OF BREAST CANCER: ICD-10-CM

## 2020-04-22 RX ORDER — LIDOCAINE 50 MG/G
PATCH TOPICAL
Qty: 60 EACH | Refills: 0 | Status: SHIPPED | OUTPATIENT
Start: 2020-04-22 | End: 2020-06-02

## 2020-05-04 RX ORDER — LIDOCAINE 50 MG/G
PATCH TOPICAL
Qty: 60 EACH | Refills: 5 | OUTPATIENT
Start: 2020-05-04

## 2020-06-01 DIAGNOSIS — G89.29 CHRONIC PAIN OF BOTH KNEES: ICD-10-CM

## 2020-06-01 DIAGNOSIS — M25.562 CHRONIC PAIN OF BOTH KNEES: ICD-10-CM

## 2020-06-01 DIAGNOSIS — M25.561 CHRONIC PAIN OF BOTH KNEES: ICD-10-CM

## 2020-06-01 RX ORDER — TRAMADOL HYDROCHLORIDE 50 MG/1
TABLET ORAL
Qty: 270 TABLET | Refills: 0 | OUTPATIENT
Start: 2020-06-01

## 2020-06-02 RX ORDER — LIDOCAINE 50 MG/G
PATCH TOPICAL
Qty: 60 EACH | Refills: 0 | Status: SHIPPED | OUTPATIENT
Start: 2020-06-02 | End: 2020-08-04

## 2020-06-09 ENCOUNTER — OFFICE VISIT (OUTPATIENT)
Dept: FAMILY MEDICINE CLINIC | Facility: CLINIC | Age: 80
End: 2020-06-09

## 2020-06-09 VITALS
HEIGHT: 66 IN | SYSTOLIC BLOOD PRESSURE: 130 MMHG | DIASTOLIC BLOOD PRESSURE: 72 MMHG | BODY MASS INDEX: 44.03 KG/M2 | OXYGEN SATURATION: 98 % | HEART RATE: 71 BPM | TEMPERATURE: 96.9 F | WEIGHT: 274 LBS

## 2020-06-09 DIAGNOSIS — M25.562 CHRONIC PAIN OF BOTH KNEES: ICD-10-CM

## 2020-06-09 DIAGNOSIS — J01.00 ACUTE NON-RECURRENT MAXILLARY SINUSITIS: ICD-10-CM

## 2020-06-09 DIAGNOSIS — L21.9 SEBORRHEIC DERMATITIS OF SCALP: ICD-10-CM

## 2020-06-09 DIAGNOSIS — L20.9 ATOPIC DERMATITIS, UNSPECIFIED TYPE: ICD-10-CM

## 2020-06-09 DIAGNOSIS — M25.561 CHRONIC PAIN OF BOTH KNEES: ICD-10-CM

## 2020-06-09 DIAGNOSIS — B37.2 CUTANEOUS CANDIDIASIS: ICD-10-CM

## 2020-06-09 DIAGNOSIS — G89.29 CHRONIC PAIN OF BOTH KNEES: ICD-10-CM

## 2020-06-09 DIAGNOSIS — L89.312 PRESSURE INJURY OF RIGHT BUTTOCK, STAGE 2 (HCC): Primary | ICD-10-CM

## 2020-06-09 PROCEDURE — 99214 OFFICE O/P EST MOD 30 MIN: CPT | Performed by: NURSE PRACTITIONER

## 2020-06-09 RX ORDER — TRAMADOL HYDROCHLORIDE 50 MG/1
50 TABLET ORAL 3 TIMES DAILY
Qty: 270 TABLET | Refills: 0 | Status: SHIPPED | OUTPATIENT
Start: 2020-06-09 | End: 2020-09-10 | Stop reason: SDUPTHER

## 2020-06-09 RX ORDER — CLOBETASOL PROPIONATE 0.46 MG/ML
SOLUTION TOPICAL 2 TIMES DAILY
Qty: 50 ML | Refills: 2 | Status: SHIPPED | OUTPATIENT
Start: 2020-06-09 | End: 2020-10-26

## 2020-06-09 RX ORDER — AMOXICILLIN AND CLAVULANATE POTASSIUM 875; 125 MG/1; MG/1
1 TABLET, FILM COATED ORAL 2 TIMES DAILY
Qty: 14 TABLET | Refills: 0 | Status: SHIPPED | OUTPATIENT
Start: 2020-06-09 | End: 2020-09-10

## 2020-06-09 RX ORDER — NYSTATIN 100000 U/G
CREAM TOPICAL
Qty: 30 G | Refills: 2 | Status: SHIPPED | OUTPATIENT
Start: 2020-06-09 | End: 2021-01-05 | Stop reason: SDUPTHER

## 2020-06-09 NOTE — PROGRESS NOTES
Chief Complaint   Patient presents with   • Skin Lesion        Subjective   Paradise Whitman is a 80 y.o.  female who presents today for skin lesion.    HPI:  SKIN:  Patient noticed an area near her tailbone that is very sore.  She is mobile but spends much of her day sitting.  It has been painful.  She has been unable to visualize the area nor has anyone else.  CHRONIC KNEE PAIN:  This is a chronic problem for the patient.  She is requesting a refill of her Tramadol.  She uses them PRN for the knee pain.  She has been out for >2 weeks.  She is aware that she needs to update her Controlled Substance Agreement today.  RASH:  She has a rash under both breasts.  She has tried some steroid cream that did not help any at all.  The rash burns and the skin is tender.  It has been present >2 weeks as well.  SINUS:  Patient states she has sinus pain and pressure for the past 10 days or so, worsening over the past 2 days.  She has production of thick, discolored mucus from both nares.    Paradise Whitman  has a past medical history of Anxiety, Depression, and Hypertension.    Allergies   Allergen Reactions   • Hydrocodone Swelling   • Macrobid [Nitrofurantoin] Unknown (See Comments)     Ask pt   • Bactrim [Sulfamethoxazole-Trimethoprim] Nausea Only   • Sulfa Antibiotics Nausea Only       Current Outpatient Medications:   •  azelastine (ASTELIN) 0.1 % nasal spray, 2 sprays into the nostril(s) as directed by provider 2 (Two) Times a Day. Use in each nostril as directed, Disp: 1 each, Rfl: 12  •  clobetasol (TEMOVATE) 0.05 % external solution, Apply  topically to the appropriate area as directed 2 (Two) Times a Day., Disp: 50 mL, Rfl: 2  •  divalproex (DEPAKOTE) 250 MG DR tablet, Take 1 tablet by mouth 2 (Two) Times a Day., Disp: 180 tablet, Rfl: 1  •  lidocaine (LIDODERM) 5 %, APPLY ONE PATCH TO EACH KNEE ONCE A DAY, Disp: 60 each, Rfl: 0  •  mometasone (NASONEX) 50 MCG/ACT nasal spray, 2 sprays into the nostril(s) as directed  by provider Daily., Disp: 1 each, Rfl: 12  •  mupirocin (BACTROBAN) 2 % ointment, Apply  topically to the appropriate area as directed 2 (Two) Times a Day., Disp: 30 g, Rfl: 2  •  ondansetron ODT (ZOFRAN ODT) 4 MG disintegrating tablet, Take 1 tablet by mouth Every 8 (Eight) Hours As Needed for Nausea., Disp: 30 tablet, Rfl: 1  •  sertraline (ZOLOFT) 100 MG tablet, Take 1.5 tablets by mouth Daily., Disp: 135 tablet, Rfl: 1  •  valsartan-hydrochlorothiazide (DIOVAN HCT) 160-12.5 MG per tablet, Take 1 tablet by mouth Daily., Disp: 90 tablet, Rfl: 1  •  amoxicillin-clavulanate (Augmentin) 875-125 MG per tablet, Take 1 tablet by mouth 2 (Two) Times a Day., Disp: 14 tablet, Rfl: 0  •  nystatin (MYCOSTATIN) 819348 UNIT/GM cream, Apply to rash under breasts twice daily until healed., Disp: 30 g, Rfl: 2  •  silver sulfadiazine (Silvadene) 1 % cream, Apply to open area on buttock twice daily until healed., Disp: 50 g, Rfl: 2  •  traMADol (ULTRAM) 50 MG tablet, Take 1 tablet by mouth 3 (Three) Times a Day. PRN, Disp: 270 tablet, Rfl: 0  Past Medical History:   Diagnosis Date   • Anxiety    • Depression    • Hypertension      Past Surgical History:   Procedure Laterality Date   • BREAST SURGERY     • CHOLECYSTECTOMY     • HYSTERECTOMY       Social History     Socioeconomic History   • Marital status:      Spouse name: Not on file   • Number of children: Not on file   • Years of education: Not on file   • Highest education level: Not on file   Tobacco Use   • Smoking status: Never Smoker   • Smokeless tobacco: Never Used   Substance and Sexual Activity   • Alcohol use: No     Frequency: Never   • Drug use: No   • Sexual activity: Defer     Family History   Problem Relation Age of Onset   • No Known Problems Mother    • No Known Problems Father    • No Known Problems Maternal Grandmother    • No Known Problems Maternal Grandfather    • No Known Problems Paternal Grandmother    • No Known Problems Paternal Grandfather   "      Family history, surgical history, past medical history, Allergies and med's reviewed with patient today and updated in [facility] EMR.     ROS:  Review of Systems   Constitutional: Positive for fatigue. Negative for fever and unexpected weight change.   HENT: Positive for postnasal drip, sinus pressure and sinus pain. Negative for facial swelling, sore throat and trouble swallowing.    Eyes: Negative.  Negative for photophobia, discharge and visual disturbance.   Respiratory: Negative.  Negative for cough, chest tightness and shortness of breath.    Cardiovascular: Negative.  Negative for chest pain and palpitations.   Gastrointestinal: Negative.  Negative for abdominal pain, diarrhea, nausea and vomiting.   Endocrine: Negative.  Negative for polydipsia, polyphagia and polyuria.   Genitourinary: Negative.  Negative for dysuria, flank pain and frequency.   Musculoskeletal: Positive for arthralgias, gait problem and joint swelling. Negative for back pain and neck pain.        Chronic knee pain.   Skin: Positive for rash.   Allergic/Immunologic: Negative.    Neurological: Positive for weakness. Negative for dizziness, light-headedness and headaches.   Hematological: Negative.    Psychiatric/Behavioral: Negative.  Negative for self-injury and suicidal ideas.       OBJECTIVE:  Vitals:    06/09/20 1121   BP: 130/72   BP Location: Left arm   Patient Position: Sitting   Cuff Size: Large Adult   Pulse: 71   Temp: 96.9 °F (36.1 °C)   TempSrc: Skin   SpO2: 98%   Weight: 124 kg (274 lb)   Height: 167.6 cm (65.98\")     Physical Exam   Constitutional: She is oriented to person, place, and time. She appears well-developed and well-nourished. No distress.   HENT:   Head: Normocephalic and atraumatic.   Right Ear: External ear normal.   Left Ear: External ear normal.   Mouth/Throat: Oropharynx is clear and moist.   Pain with maxillary percussion.  Evidence of post nasal drainage.   Eyes: Pupils are equal, round, and reactive to " light. Conjunctivae and EOM are normal.   Neck: Normal range of motion. Neck supple.   Cardiovascular: Normal rate, regular rhythm, normal heart sounds and intact distal pulses.   No murmur heard.  Pulmonary/Chest: Effort normal and breath sounds normal. No respiratory distress.   Abdominal: Soft. Bowel sounds are normal. She exhibits no distension. There is no tenderness.   Musculoskeletal: Normal range of motion. She exhibits edema and tenderness.   Bilateral knee crepitus noted with moderate effusions present.   Neurological: She is alert and oriented to person, place, and time.   Skin: Skin is warm and dry. Capillary refill takes less than 2 seconds. Rash noted. She is not diaphoretic. No erythema.   The right buttock has a 1.3 cm x 0.7 cm superficial breakdown of skin present.  No drainage.  No surrounding erythema.  Under bilateral breasts there is shiny, erythematous patch with scaly irregular border consistent with candida.   Psychiatric: She has a normal mood and affect. Her behavior is normal. Judgment and thought content normal.   Nursing note and vitals reviewed.      ASSESSMENT/ PLAN:    Paradise was seen today for skin lesion.    Diagnoses and all orders for this visit:    Pressure injury of right buttock, stage 2 (CMS/HCC)  -     silver sulfadiazine (Silvadene) 1 % cream; Apply to open area on buttock twice daily until healed.    Chronic pain of both knees  -     traMADol (ULTRAM) 50 MG tablet; Take 1 tablet by mouth 3 (Three) Times a Day. PRN    Cutaneous candidiasis  -     nystatin (MYCOSTATIN) 060045 UNIT/GM cream; Apply to rash under breasts twice daily until healed.    Atopic dermatitis, unspecified type    Seborrheic dermatitis of scalp  -     clobetasol (TEMOVATE) 0.05 % external solution; Apply  topically to the appropriate area as directed 2 (Two) Times a Day.    Acute non-recurrent maxillary sinusitis  -     amoxicillin-clavulanate (Augmentin) 875-125 MG per tablet; Take 1 tablet by mouth 2  (Two) Times a Day.    LAURYN reviewed.    Orders Placed Today:     New Medications Ordered This Visit   Medications   • silver sulfadiazine (Silvadene) 1 % cream     Sig: Apply to open area on buttock twice daily until healed.     Dispense:  50 g     Refill:  2   • traMADol (ULTRAM) 50 MG tablet     Sig: Take 1 tablet by mouth 3 (Three) Times a Day. PRN     Dispense:  270 tablet     Refill:  0   • nystatin (MYCOSTATIN) 009884 UNIT/GM cream     Sig: Apply to rash under breasts twice daily until healed.     Dispense:  30 g     Refill:  2   • clobetasol (TEMOVATE) 0.05 % external solution     Sig: Apply  topically to the appropriate area as directed 2 (Two) Times a Day.     Dispense:  50 mL     Refill:  2   • amoxicillin-clavulanate (Augmentin) 875-125 MG per tablet     Sig: Take 1 tablet by mouth 2 (Two) Times a Day.     Dispense:  14 tablet     Refill:  0        Management Plan:     An After Visit Summary was printed and given to the patient at discharge.    Follow-up: Return in about 3 months (around 9/9/2020) for Next scheduled follow up.    Lisa Ruiz, APRN 6/9/2020 20:34  This note was electronically signed.

## 2020-06-16 ENCOUNTER — TELEPHONE (OUTPATIENT)
Dept: FAMILY MEDICINE CLINIC | Facility: CLINIC | Age: 80
End: 2020-06-16

## 2020-06-16 DIAGNOSIS — I10 ESSENTIAL HYPERTENSION: ICD-10-CM

## 2020-06-16 DIAGNOSIS — M25.562 CHRONIC PAIN OF BOTH KNEES: Primary | ICD-10-CM

## 2020-06-16 DIAGNOSIS — E66.01 MORBID OBESITY (HCC): ICD-10-CM

## 2020-06-16 DIAGNOSIS — R53.1 GENERAL WEAKNESS: ICD-10-CM

## 2020-06-16 DIAGNOSIS — G89.29 CHRONIC PAIN OF BOTH KNEES: Primary | ICD-10-CM

## 2020-06-16 DIAGNOSIS — M25.561 CHRONIC PAIN OF BOTH KNEES: Primary | ICD-10-CM

## 2020-06-16 DIAGNOSIS — L89.312 PRESSURE INJURY OF RIGHT BUTTOCK, STAGE 2 (HCC): ICD-10-CM

## 2020-06-16 NOTE — TELEPHONE ENCOUNTER
Patient called and stated she would like to have a referral to .  Please advise?  Patient stated she did not care who HH is with.

## 2020-06-22 ENCOUNTER — OUTSIDE FACILITY SERVICE (OUTPATIENT)
Dept: FAMILY MEDICINE CLINIC | Facility: CLINIC | Age: 80
End: 2020-06-22

## 2020-06-22 PROCEDURE — G0180 MD CERTIFICATION HHA PATIENT: HCPCS | Performed by: FAMILY MEDICINE

## 2020-07-20 ENCOUNTER — TELEPHONE (OUTPATIENT)
Dept: FAMILY MEDICINE CLINIC | Facility: CLINIC | Age: 80
End: 2020-07-20

## 2020-07-20 NOTE — TELEPHONE ENCOUNTER
Per Angela, they rec'd HH referral for Pt. She stated that diagnosis codes specified will not cover services. She is checking to see if there are any others that can be used?

## 2020-08-04 RX ORDER — LIDOCAINE 50 MG/G
PATCH TOPICAL
Qty: 60 EACH | Refills: 0 | Status: SHIPPED | OUTPATIENT
Start: 2020-08-04 | End: 2020-10-12

## 2020-08-21 ENCOUNTER — OUTSIDE FACILITY SERVICE (OUTPATIENT)
Dept: FAMILY MEDICINE CLINIC | Facility: CLINIC | Age: 80
End: 2020-08-21

## 2020-08-21 PROCEDURE — G0179 MD RECERTIFICATION HHA PT: HCPCS | Performed by: FAMILY MEDICINE

## 2020-09-02 DIAGNOSIS — M25.561 CHRONIC PAIN OF BOTH KNEES: ICD-10-CM

## 2020-09-02 DIAGNOSIS — G89.29 CHRONIC PAIN OF BOTH KNEES: ICD-10-CM

## 2020-09-02 DIAGNOSIS — M25.562 CHRONIC PAIN OF BOTH KNEES: ICD-10-CM

## 2020-09-04 RX ORDER — TRAMADOL HYDROCHLORIDE 50 MG/1
TABLET ORAL
Qty: 270 TABLET | Refills: 0 | OUTPATIENT
Start: 2020-09-04

## 2020-09-04 RX ORDER — SERTRALINE HYDROCHLORIDE 100 MG/1
TABLET, FILM COATED ORAL
Qty: 135 TABLET | Refills: 1 | Status: SHIPPED | OUTPATIENT
Start: 2020-09-04 | End: 2020-10-27 | Stop reason: SDUPTHER

## 2020-09-04 NOTE — TELEPHONE ENCOUNTER
Patient needs 3 month med OV, UDS, and CS Contract.  Please contact.  No refills until then on Tramadol

## 2020-09-04 NOTE — TELEPHONE ENCOUNTER
Caller: Paradise Whitman    Relationship: Self    Best call back number: [phone]    Medication needed:   Requested Prescriptions     Pending Prescriptions Disp Refills   • sertraline (ZOLOFT) 100 MG tablet [Pharmacy Med Name: SERTRALINE HCL 100MG TABS] 135 tablet 1     Sig: TAKE ONE AND ONE-HALF TABLETS BY MOUTH EVERY DAY   • traMADol (ULTRAM) 50 MG tablet [Pharmacy Med Name: TRAMADOL HCL 50MG TABS] 270 tablet 0     Sig: TAKE ONE TABLET BY MOUTH THREE TIMES A DAY AS NEEDED       When do you need the refill by: end of day    What details did the patient provide when requesting the medication: patient is completely out    Does the patient have less than a 3 day supply:  [x] Yes  [] No    What is the patient's preferred pharmacy: [facility] [address] [phone] [facility] [phone]

## 2020-09-10 ENCOUNTER — OFFICE VISIT (OUTPATIENT)
Dept: FAMILY MEDICINE CLINIC | Facility: CLINIC | Age: 80
End: 2020-09-10

## 2020-09-10 VITALS
BODY MASS INDEX: 44.25 KG/M2 | TEMPERATURE: 97.2 F | HEART RATE: 101 BPM | OXYGEN SATURATION: 97 % | SYSTOLIC BLOOD PRESSURE: 106 MMHG | HEIGHT: 66 IN | DIASTOLIC BLOOD PRESSURE: 65 MMHG

## 2020-09-10 DIAGNOSIS — M17.0 PRIMARY OSTEOARTHRITIS OF BOTH KNEES: Primary | ICD-10-CM

## 2020-09-10 DIAGNOSIS — M25.462 EFFUSION OF BURSA OF LEFT KNEE: ICD-10-CM

## 2020-09-10 DIAGNOSIS — Z79.899 ENCOUNTER FOR LONG-TERM (CURRENT) USE OF HIGH-RISK MEDICATION: ICD-10-CM

## 2020-09-10 DIAGNOSIS — E66.01 MORBID (SEVERE) OBESITY DUE TO EXCESS CALORIES (HCC): ICD-10-CM

## 2020-09-10 DIAGNOSIS — G89.29 CHRONIC PAIN OF BOTH KNEES: ICD-10-CM

## 2020-09-10 DIAGNOSIS — I10 ESSENTIAL HYPERTENSION: ICD-10-CM

## 2020-09-10 DIAGNOSIS — R53.1 GENERAL WEAKNESS: ICD-10-CM

## 2020-09-10 DIAGNOSIS — M25.562 CHRONIC PAIN OF BOTH KNEES: ICD-10-CM

## 2020-09-10 DIAGNOSIS — M25.561 CHRONIC PAIN OF BOTH KNEES: ICD-10-CM

## 2020-09-10 PROBLEM — K21.9 GASTROESOPHAGEAL REFLUX DISEASE: Status: ACTIVE | Noted: 2020-09-10

## 2020-09-10 PROBLEM — R32 URINARY INCONTINENCE: Status: ACTIVE | Noted: 2020-09-10

## 2020-09-10 PROBLEM — G47.33 OBSTRUCTIVE SLEEP APNEA SYNDROME: Status: ACTIVE | Noted: 2020-09-10

## 2020-09-10 PROBLEM — C50.919 MALIGNANT NEOPLASM OF BREAST (HCC): Status: ACTIVE | Noted: 2020-09-10

## 2020-09-10 PROBLEM — M25.569 KNEE PAIN: Status: ACTIVE | Noted: 2020-09-10

## 2020-09-10 PROCEDURE — 99214 OFFICE O/P EST MOD 30 MIN: CPT | Performed by: NURSE PRACTITIONER

## 2020-09-10 RX ORDER — TRAMADOL HYDROCHLORIDE 50 MG/1
50 TABLET ORAL 3 TIMES DAILY
Qty: 270 TABLET | Refills: 0 | Status: SHIPPED | OUTPATIENT
Start: 2020-09-10 | End: 2020-10-27 | Stop reason: SDUPTHER

## 2020-09-10 NOTE — PROGRESS NOTES
"Chief Complaint   Patient presents with   • Knee Pain     bilateral, L > R   • Drug / Alcohol Assessment   • Med Refill     Ultram        Subjective   Paradise Whitman is a 80 y.o.  female who presents today for bilateral knee pain/UDS and new contract.    HPI:  KNEE PAIN:  Patient presents for refill of pain medication and assessment of increasing pain to the left knee.  She also has pain of the right knee; however, it is stable and not nearly as painful as the left knee.  She is having trouble because her lift chair broke and it is \"near impossible\" for her to rise from a seated position.  This is complicated by her morbid obesity.  She does have a girl staying with her a few hours every day to help her.  She is in need of an updated UDS and controlled substance agreement.  She is aware of risks as well as benefits of continued PRN use of narcotic pain medication.  She does not take it routinely but strictly on an as needed basis.    Paradise Whitman  has a past medical history of Anxiety, Depression, and Hypertension.    Allergies   Allergen Reactions   • Hydrocodone Swelling   • Macrobid [Nitrofurantoin] Unknown (See Comments)     Ask pt   • Bactrim [Sulfamethoxazole-Trimethoprim] Nausea Only   • Sulfa Antibiotics Nausea Only       Current Outpatient Medications:   •  azelastine (ASTELIN) 0.1 % nasal spray, 2 sprays into the nostril(s) as directed by provider 2 (Two) Times a Day. Use in each nostril as directed, Disp: 1 each, Rfl: 12  •  clobetasol (TEMOVATE) 0.05 % external solution, Apply  topically to the appropriate area as directed 2 (Two) Times a Day., Disp: 50 mL, Rfl: 2  •  divalproex (DEPAKOTE) 250 MG DR tablet, Take 1 tablet by mouth 2 (Two) Times a Day., Disp: 180 tablet, Rfl: 1  •  lidocaine (LIDODERM) 5 %, APPLY ONE PATCH TO EACH KNEE ONCE A DAY, Disp: 60 each, Rfl: 0  •  mometasone (NASONEX) 50 MCG/ACT nasal spray, 2 sprays into the nostril(s) as directed by provider Daily., Disp: 1 each, Rfl: " 12  •  mupirocin (BACTROBAN) 2 % ointment, Apply  topically to the appropriate area as directed 2 (Two) Times a Day., Disp: 30 g, Rfl: 2  •  nystatin (MYCOSTATIN) 422568 UNIT/GM cream, Apply to rash under breasts twice daily until healed., Disp: 30 g, Rfl: 2  •  ondansetron ODT (ZOFRAN ODT) 4 MG disintegrating tablet, Take 1 tablet by mouth Every 8 (Eight) Hours As Needed for Nausea., Disp: 30 tablet, Rfl: 1  •  sertraline (ZOLOFT) 100 MG tablet, TAKE ONE AND ONE-HALF TABLETS BY MOUTH EVERY DAY, Disp: 135 tablet, Rfl: 1  •  silver sulfadiazine (Silvadene) 1 % cream, Apply to open area on buttock twice daily until healed., Disp: 50 g, Rfl: 2  •  traMADol (ULTRAM) 50 MG tablet, Take 1 tablet by mouth 3 (Three) Times a Day. PRN, Disp: 270 tablet, Rfl: 0  •  valsartan-hydrochlorothiazide (DIOVAN HCT) 160-12.5 MG per tablet, Take 1 tablet by mouth Daily., Disp: 90 tablet, Rfl: 1  Past Medical History:   Diagnosis Date   • Anxiety    • Depression    • Hypertension      Past Surgical History:   Procedure Laterality Date   • BREAST SURGERY     • CHOLECYSTECTOMY     • HYSTERECTOMY       Social History     Socioeconomic History   • Marital status:      Spouse name: Not on file   • Number of children: Not on file   • Years of education: Not on file   • Highest education level: Not on file   Tobacco Use   • Smoking status: Never Smoker   • Smokeless tobacco: Never Used   Substance and Sexual Activity   • Alcohol use: No     Frequency: Never   • Drug use: No   • Sexual activity: Defer     Family History   Problem Relation Age of Onset   • No Known Problems Mother    • No Known Problems Father    • No Known Problems Maternal Grandmother    • No Known Problems Maternal Grandfather    • No Known Problems Paternal Grandmother    • No Known Problems Paternal Grandfather        Family history, surgical history, past medical history, Allergies and med's reviewed with patient today and updated in [employer] EMR.     ROS:  Review of  "Systems   Constitutional: Positive for fatigue. Negative for fever and unexpected weight change.   HENT: Negative.  Negative for facial swelling, sore throat and trouble swallowing.    Eyes: Negative.  Negative for photophobia, discharge and visual disturbance.   Respiratory: Negative.  Negative for cough, chest tightness and shortness of breath.    Cardiovascular: Negative.  Negative for chest pain and palpitations.   Gastrointestinal: Negative.  Negative for abdominal pain, diarrhea, nausea and vomiting.   Endocrine: Negative.  Negative for polydipsia, polyphagia and polyuria.   Genitourinary: Negative.  Negative for dysuria, flank pain and frequency.        Urinary incontinence.   Musculoskeletal: Positive for arthralgias, gait problem and joint swelling. Negative for back pain and neck pain.        Ambulates with seated walker and assistance.  Bilateral knee pain, chronic, worsening.   Skin: Negative.  Negative for rash.   Allergic/Immunologic: Negative.    Neurological: Positive for weakness. Negative for dizziness, light-headedness and headaches.        Upper body weakness.   Hematological: Negative.    Psychiatric/Behavioral: Negative.  Negative for self-injury and suicidal ideas.       OBJECTIVE:  Vitals:    09/10/20 1130   BP: 106/65   BP Location: Left arm   Patient Position: Sitting   Cuff Size: Large Adult   Pulse: 101   Temp: 97.2 °F (36.2 °C)   TempSrc: Infrared   SpO2: 97%   Height: 167.6 cm (65.98\")     Physical Exam   Constitutional: She is oriented to person, place, and time. She appears well-developed and well-nourished. No distress.   HENT:   Head: Normocephalic and atraumatic.   Eyes: Pupils are equal, round, and reactive to light. Conjunctivae and EOM are normal.   Neck: Normal range of motion. Neck supple.   Cardiovascular: Normal rate, regular rhythm, normal heart sounds and intact distal pulses.   No murmur heard.  Evidence of bilateral varicose veins with no distal edema.   Pulmonary/Chest: " Effort normal and breath sounds normal. No respiratory distress.   Abdominal: Soft. Bowel sounds are normal. She exhibits no distension. There is no tenderness.   Musculoskeletal: Normal range of motion. She exhibits edema, tenderness and deformity.   The left knee has presence of medial effusion and new deformity as the lower leg is slightly abducted from the body.  ROM is painful.  There is tenderness.  The right knee, while painful, has less noted edema and no signs of abduction or adduction.   Neurological: She is alert and oriented to person, place, and time.   Skin: Skin is warm and dry. Capillary refill takes less than 2 seconds. She is not diaphoretic. No erythema.   Psychiatric: She has a normal mood and affect. Her behavior is normal. Judgment and thought content normal.   Nursing note and vitals reviewed.      ASSESSMENT/ PLAN:    Paradise was seen today for knee pain, drug / alcohol assessment and med refill.    Diagnoses and all orders for this visit:    Primary osteoarthritis of both knees  -     Miscellaneous DME  -     Ambulatory Referral to Orthopedic Surgery    Chronic pain of both knees  -     traMADol (ULTRAM) 50 MG tablet; Take 1 tablet by mouth 3 (Three) Times a Day. PRN  -     Miscellaneous DME  -     Ambulatory Referral to Orthopedic Surgery    Effusion of bursa of left knee  -     Miscellaneous DME  -     Ambulatory Referral to Orthopedic Surgery    Morbid (severe) obesity due to excess calories (CMS/HCC)  -     Miscellaneous DME  -     Ambulatory Referral to Orthopedic Surgery    General weakness  -     Miscellaneous DME  -     Ambulatory Referral to Orthopedic Surgery    Essential hypertension    Encounter for long-term (current) use of high-risk medication  -     Compliance Drug Analysis, Ur - Urine, Clean Catch    LAURYN reviewed.    Orders Placed Today:     New Medications Ordered This Visit   Medications   • traMADol (ULTRAM) 50 MG tablet     Sig: Take 1 tablet by mouth 3 (Three) Times  a Day. PRN     Dispense:  270 tablet     Refill:  0        Management Plan:     An After Visit Summary was printed and given to the patient at discharge.    Follow-up: Return in about 3 months (around 12/10/2020) for Next scheduled follow up.    Lisa Ruiz, APRN 9/10/2020 17:27  This note was electronically signed.

## 2020-09-15 LAB — DRUGS UR: NORMAL

## 2020-09-28 ENCOUNTER — TELEPHONE (OUTPATIENT)
Dept: FAMILY MEDICINE CLINIC | Facility: CLINIC | Age: 80
End: 2020-09-28

## 2020-10-01 ENCOUNTER — LAB REQUISITION (OUTPATIENT)
Dept: LAB | Facility: HOSPITAL | Age: 80
End: 2020-10-01

## 2020-10-01 DIAGNOSIS — Z00.00 ENCOUNTER FOR GENERAL ADULT MEDICAL EXAMINATION WITHOUT ABNORMAL FINDINGS: ICD-10-CM

## 2020-10-01 PROCEDURE — 88305 TISSUE EXAM BY PATHOLOGIST: CPT | Performed by: PLASTIC SURGERY

## 2020-10-06 DIAGNOSIS — Z12.31 ENCOUNTER FOR SCREENING MAMMOGRAM FOR BREAST CANCER: Primary | ICD-10-CM

## 2020-10-07 ENCOUNTER — FLU SHOT (OUTPATIENT)
Dept: FAMILY MEDICINE CLINIC | Facility: CLINIC | Age: 80
End: 2020-10-07

## 2020-10-07 DIAGNOSIS — Z23 NEED FOR 23-POLYVALENT PNEUMOCOCCAL POLYSACCHARIDE VACCINE: ICD-10-CM

## 2020-10-07 DIAGNOSIS — Z23 NEED FOR INFLUENZA VACCINATION: Primary | ICD-10-CM

## 2020-10-07 NOTE — TELEPHONE ENCOUNTER
I have faxed referral to [facility] which is patient preference and patient has appt on 10/9/20.

## 2020-10-12 RX ORDER — LIDOCAINE 50 MG/G
PATCH TOPICAL
Qty: 60 EACH | Refills: 2 | Status: SHIPPED | OUTPATIENT
Start: 2020-10-12 | End: 2020-10-27 | Stop reason: SDUPTHER

## 2020-10-13 DIAGNOSIS — Z12.31 ENCOUNTER FOR SCREENING MAMMOGRAM FOR BREAST CANCER: ICD-10-CM

## 2020-10-21 ENCOUNTER — TRANSCRIBE ORDERS (OUTPATIENT)
Dept: ADMINISTRATIVE | Facility: HOSPITAL | Age: 80
End: 2020-10-21

## 2020-10-21 DIAGNOSIS — Z11.59 SCREENING FOR VIRAL DISEASE: Primary | ICD-10-CM

## 2020-10-26 ENCOUNTER — APPOINTMENT (OUTPATIENT)
Dept: PREADMISSION TESTING | Facility: HOSPITAL | Age: 80
End: 2020-10-26

## 2020-10-26 ENCOUNTER — OFFICE VISIT (OUTPATIENT)
Dept: FAMILY MEDICINE CLINIC | Facility: CLINIC | Age: 80
End: 2020-10-26

## 2020-10-26 ENCOUNTER — LAB (OUTPATIENT)
Dept: LAB | Facility: HOSPITAL | Age: 80
End: 2020-10-26

## 2020-10-26 VITALS
SYSTOLIC BLOOD PRESSURE: 132 MMHG | OXYGEN SATURATION: 96 % | DIASTOLIC BLOOD PRESSURE: 82 MMHG | RESPIRATION RATE: 18 BRPM | HEART RATE: 83 BPM

## 2020-10-26 VITALS
SYSTOLIC BLOOD PRESSURE: 144 MMHG | TEMPERATURE: 97.1 F | DIASTOLIC BLOOD PRESSURE: 80 MMHG | OXYGEN SATURATION: 98 % | HEART RATE: 77 BPM

## 2020-10-26 DIAGNOSIS — I48.91 NEW ONSET ATRIAL FIBRILLATION (HCC): Primary | ICD-10-CM

## 2020-10-26 DIAGNOSIS — E66.01 MORBID (SEVERE) OBESITY DUE TO EXCESS CALORIES (HCC): ICD-10-CM

## 2020-10-26 DIAGNOSIS — M17.0 PRIMARY OSTEOARTHRITIS OF BOTH KNEES: ICD-10-CM

## 2020-10-26 DIAGNOSIS — I10 ESSENTIAL HYPERTENSION: ICD-10-CM

## 2020-10-26 LAB
ANION GAP SERPL CALCULATED.3IONS-SCNC: 8 MMOL/L (ref 5–15)
BUN SERPL-MCNC: 24 MG/DL (ref 8–23)
BUN/CREAT SERPL: 33.8 (ref 7–25)
CALCIUM SPEC-SCNC: 9.2 MG/DL (ref 8.6–10.5)
CHLORIDE SERPL-SCNC: 102 MMOL/L (ref 98–107)
CO2 SERPL-SCNC: 32 MMOL/L (ref 22–29)
CREAT SERPL-MCNC: 0.71 MG/DL (ref 0.57–1)
DEPRECATED RDW RBC AUTO: 47.3 FL (ref 37–54)
ERYTHROCYTE [DISTWIDTH] IN BLOOD BY AUTOMATED COUNT: 13.4 % (ref 12.3–15.4)
GFR SERPL CREATININE-BSD FRML MDRD: 79 ML/MIN/1.73
GLUCOSE SERPL-MCNC: 109 MG/DL (ref 65–99)
HCT VFR BLD AUTO: 44 % (ref 34–46.6)
HGB BLD-MCNC: 14.8 G/DL (ref 12–15.9)
MCH RBC QN AUTO: 32.2 PG (ref 26.6–33)
MCHC RBC AUTO-ENTMCNC: 33.6 G/DL (ref 31.5–35.7)
MCV RBC AUTO: 95.9 FL (ref 79–97)
PLATELET # BLD AUTO: 223 10*3/MM3 (ref 140–450)
PMV BLD AUTO: 10.4 FL (ref 6–12)
POTASSIUM SERPL-SCNC: 3.8 MMOL/L (ref 3.5–5.2)
RBC # BLD AUTO: 4.59 10*6/MM3 (ref 3.77–5.28)
SARS-COV-2 N GENE RESP QL NAA+PROBE: NOT DETECTED
SODIUM SERPL-SCNC: 142 MMOL/L (ref 136–145)
WBC # BLD AUTO: 5.91 10*3/MM3 (ref 3.4–10.8)

## 2020-10-26 PROCEDURE — 93005 ELECTROCARDIOGRAM TRACING: CPT

## 2020-10-26 PROCEDURE — 80048 BASIC METABOLIC PNL TOTAL CA: CPT | Performed by: PODIATRIST

## 2020-10-26 PROCEDURE — 93010 ELECTROCARDIOGRAM REPORT: CPT | Performed by: INTERNAL MEDICINE

## 2020-10-26 PROCEDURE — 36415 COLL VENOUS BLD VENIPUNCTURE: CPT

## 2020-10-26 PROCEDURE — C9803 HOPD COVID-19 SPEC COLLECT: HCPCS | Performed by: PLASTIC SURGERY

## 2020-10-26 PROCEDURE — 87635 SARS-COV-2 COVID-19 AMP PRB: CPT | Performed by: PLASTIC SURGERY

## 2020-10-26 PROCEDURE — 85027 COMPLETE CBC AUTOMATED: CPT | Performed by: PODIATRIST

## 2020-10-26 PROCEDURE — 99214 OFFICE O/P EST MOD 30 MIN: CPT | Performed by: NURSE PRACTITIONER

## 2020-10-26 NOTE — DISCHARGE INSTRUCTIONS
DAY OF SURGERY INSTRUCTIONS        YOUR SURGEON: ***JAY RUSS    PROCEDURE: ***EXCISION BASAL CELL    DATE OF SURGERY: ***October 28,2020    ARRIVAL TIME: AS DIRECTED BY OFFICE    YOU MAY TAKE THE FOLLOWING MEDICATION(S) THE MORNING OF SURGERY WITH A SIP OF WATER: ***NONE    ALL OTHER HOME MEDICATIONS CHECK WITH YOUR DOCTOR    DO NOT TAKE ANY ERECTILE DYSFUNCTION MEDICATIONS (EX:  CIALIS, VIAGRA) 24 HOURS PRIOR TO SURGERY              MANAGING PAIN AFTER SURGERY    We know you are probably wondering what your pain will be like after surgery.  Following surgery it is unrealistic to expect you will not have pain.   Pain is how our bodies let us know that something is wrong or cautions us to be careful.  That said, our goal is to make your pain tolerable.    Methods we may use to treat your pain include (oral or IV medications, PCAs, epidurals, nerve blocks, etc.)   While some procedures require IV pain medications for a short time after surgery, transitioning to pain medications by mouth allows for better management of pain.   Your nurse will encourage you to take oral pain medications whenever possible.  IV medications work almost immediately, but only last a short while.  Taking medications by mouth allows for a more constant level of medication in your blood stream for a longer period of time.      Once your pain is out of control it is harder to get back under control.  It is important you are aware when your next dose of pain medication is due.  If you are admitted, your nurse may write the time of your next dose on the white board in your room to help you remember.      We are interested in your pain and encourage you to inform us about aggravating factors during your visit.   Many times a simple repositioning every few hours can make a big difference.    If your physician says it is okay, do not let your pain prevent you from getting out of bed. Be sure to call your nurse for assistance prior to getting up  so you do not fall.      Before surgery, please decide your tolerable pain goal.  These faces help describe the pain ratings we use on a 0-10 scale.   Be prepared to tell us your goal and whether or not you take pain or anxiety medications at home.      BEFORE YOU COME TO THE HOSPITAL  (Pre-op instructions)  • Do not eat, drink, smoke or chew gum after midnight the night before surgery.  This also includes no mints.  • Morning of surgery take only the medicines you have been instructed with a sip of water unless otherwise instructed  by your physician.  • Do not shave, wear makeup or dark nail polish.  • Remove all jewelry including rings.  • Leave anything you consider valuable at home.  • Leave your suitcase in the car until after your surgery.  • Bring the following with you if applicable:  o Picture ID and insurance, Medicare or Medicaid cards  o Co-pay/deductible required by insurance (cash, check, credit card)  o Copy of advance directive, living will or power-of- documents if not brought to PAT  o CPAP or BIPAP mask and tubing  o Relaxation aids ( book, magazine), etc.  o Hearing aids                                 ON THE DAY OF SURGERY  · On the day of surgery check in at registration located at the main entrance of the hospital.   ? You will be registered and given a beeper with instructions where to wait in the main lobby.  ? When your beeper lights up and vibrates a member of the Outpatient Surgery staff will meet you at the double doors under the stair steps and escort you to your preoperative room.   · You may have cloth compression devices placed on your legs. These help to prevent blood clots and reduce swelling in your legs.  · An IV may be inserted into one of your veins.  · In the operating room, you may be given one or more of the following:  ? A medicine to help you relax (sedative).  ? A medicine to numb the area (local anesthetic).  ? A medicine to make you fall asleep (general  "anesthetic).  ? A medicine that is injected into an area of your body to numb everything below the injection site (regional anesthetic).  · Your surgical site will be marked or identified.  · You may be given an antibiotic through your IV to help prevent infection.  Contact a health care provider if you:  · Develop a fever of more than 100.4°F (38°C) or other feelings of illness during the 48 hours before your surgery.  · Have symptoms that get worse.  Have questions or concerns about your surgery    General Anesthesia/Surgery, Adult  General anesthesia is the use of medicines to make a person \"go to sleep\" (unconscious) for a medical procedure. General anesthesia must be used for certain procedures, and is often recommended for procedures that:  · Last a long time.  · Require you to be still or in an unusual position.  · Are major and can cause blood loss.  The medicines used for general anesthesia are called general anesthetics. As well as making you unconscious for a certain amount of time, these medicines:  · Prevent pain.  · Control your blood pressure.  · Relax your muscles.  Tell a health care provider about:  · Any allergies you have.  · All medicines you are taking, including vitamins, herbs, eye drops, creams, and over-the-counter medicines.  · Any problems you or family members have had with anesthetic medicines.  · Types of anesthetics you have had in the past.  · Any blood disorders you have.  · Any surgeries you have had.  · Any medical conditions you have.  · Any recent upper respiratory, chest, or ear infections.  · Any history of:  ? Heart or lung conditions, such as heart failure, sleep apnea, asthma, or chronic obstructive pulmonary disease (COPD).  ?  service.  ? Depression or anxiety.  · Any tobacco or drug use, including marijuana or alcohol use.  · Whether you are pregnant or may be pregnant.  What are the risks?  Generally, this is a safe procedure. However, problems may occur, " including:  · Allergic reaction.  · Lung and heart problems.  · Inhaling food or liquid from the stomach into the lungs (aspiration).  · Nerve injury.  · Air in the bloodstream, which can lead to stroke.  · Extreme agitation or confusion (delirium) when you wake up from the anesthetic.  · Waking up during your procedure and being unable to move. This is rare.  These problems are more likely to develop if you are having a major surgery or if you have an advanced or serious medical condition. You can prevent some of these complications by answering all of your health care provider's questions thoroughly and by following all instructions before your procedure.  General anesthesia can cause side effects, including:  · Nausea or vomiting.  · A sore throat from the breathing tube.  · Hoarseness.  · Wheezing or coughing.  · Shaking chills.  · Tiredness.  · Body aches.  · Anxiety.  · Sleepiness or drowsiness.  · Confusion or agitation.  RISKS AND COMPLICATIONS OF SURGERY  Your health care provider will discuss possible risks and complications with you before surgery. Common risks and complications include:    · Problems due to the use of anesthetics.  · Blood loss and replacement (does not apply to minor surgical procedures).  · Temporary increase in pain due to surgery.  · Uncorrected pain or problems that the surgery was meant to correct.  · Infection.  · New damage.    What happens before the procedure?    Medicines  Ask your health care provider about:  · Changing or stopping your regular medicines. This is especially important if you are taking diabetes medicines or blood thinners.  · Taking medicines such as aspirin and ibuprofen. These medicines can thin your blood. Do not take these medicines unless your health care provider tells you to take them.  · Taking over-the-counter medicines, vitamins, herbs, and supplements. Do not take these during the week before your procedure unless your health care provider approves  them.  General instructions  · Starting 3-6 weeks before the procedure, do not use any products that contain nicotine or tobacco, such as cigarettes and e-cigarettes. If you need help quitting, ask your health care provider.  · If you brush your teeth on the morning of the procedure, make sure to spit out all of the toothpaste.  · Tell your health care provider if you become ill or develop a cold, cough, or fever.  · If instructed by your health care provider, bring your sleep apnea device with you on the day of your surgery (if applicable).  · Ask your health care provider if you will be going home the same day, the following day, or after a longer hospital stay.  ? Plan to have someone take you home from the hospital or clinic.  ? Plan to have a responsible adult care for you for at least 24 hours after you leave the hospital or clinic. This is important.  What happens during the procedure?  · You will be given anesthetics through both of the following:  ? A mask placed over your nose and mouth.  ? An IV in one of your veins.  · You may receive a medicine to help you relax (sedative).  · After you are unconscious, a breathing tube may be inserted down your throat to help you breathe. This will be removed before you wake up.  · An anesthesia specialist will stay with you throughout your procedure. He or she will:  ? Keep you comfortable and safe by continuing to give you medicines and adjusting the amount of medicine that you get.  ? Monitor your blood pressure, pulse, and oxygen levels to make sure that the anesthetics do not cause any problems.  The procedure may vary among health care providers and hospitals.  What happens after the procedure?  · Your blood pressure, temperature, heart rate, breathing rate, and blood oxygen level will be monitored until the medicines you were given have worn off.  · You will wake up in a recovery area. You may wake up slowly.  · If you feel anxious or agitated, you may be given  medicine to help you calm down.  · If you will be going home the same day, your health care provider may check to make sure you can walk, drink, and urinate.  · Your health care provider will treat any pain or side effects you have before you go home.  · Do not drive for 24 hours if you were given a sedative.  Summary  · General anesthesia is used to keep you still and prevent pain during a procedure.  · It is important to tell your healthcare provider about your medical history and any surgeries you have had, and previous experience with anesthesia.  · Follow your healthcare provider’s instructions about when to stop eating, drinking, or taking certain medicines before your procedure.  · Plan to have someone take you home from the hospital or clinic.  This information is not intended to replace advice given to you by your health care provider. Make sure you discuss any questions you have with your health care provider.  Document Released: 03/26/2009 Document Revised: 08/03/2018 Document Reviewed: 08/03/2018  [employer] Patient Education © 2019 [employer]      Fall Prevention in Hospitals, Adult  As a hospital patient, your condition and the treatments you receive can increase your risk for falls. Some additional risk factors for falls in a hospital include:  · Being in an unfamiliar environment.  · Being on bed rest.  · Your surgery.  · Taking certain medicines.  · Your tubing requirements, such as intravenous (IV) therapy or catheters.  It is important that you learn how to decrease fall risks while at the hospital. Below are important tips that can help prevent falls.  SAFETY TIPS FOR PREVENTING FALLS  Talk about your risk of falling.  · Ask your health care provider why you are at risk for falling. Is it your medicine, illness, tubing placement, or something else?  · Make a plan with your health care provider to keep you safe from falls.  · Ask your health care provider or pharmacist about side  effects of your medicines. Some medicines can make you dizzy or affect your coordination.  Ask for help.  · Ask for help before getting out of bed. You may need to press your call button.  · Ask for assistance in getting safely to the toilet.  · Ask for a walker or cane to be put at your bedside. Ask that most of the side rails on your bed be placed up before your health care provider leaves the room.  · Ask family or friends to sit with you.  · Ask for things that are out of your reach, such as your glasses, hearing aids, telephone, bedside table, or call button.  Follow these tips to avoid falling:  · Stay lying or seated, rather than standing, while waiting for help.  · Wear rubber-soled slippers or shoes whenever you walk in the hospital.  · Avoid quick, sudden movements.  ¨ Change positions slowly.  ¨ Sit on the side of your bed before standing.  ¨ Stand up slowly and wait before you start to walk.  · Let your health care provider know if there is a spill on the floor.  · Pay careful attention to the medical equipment, electrical cords, and tubes around you.  · When you need help, use your call button by your bed or in the bathroom. Wait for one of your health care providers to help you.  · If you feel dizzy or unsure of your footing, return to bed and wait for assistance.  · Avoid being distracted by the TV, telephone, or another person in your room.  · Do not lean or support yourself on rolling objects, such as IV poles or bedside tables.     This information is not intended to replace advice given to you by your health care provider. Make sure you discuss any questions you have with your health care provider.     Document Released: 12/15/2001 Document Revised: 01/08/2016 Document Reviewed: 08/25/2013  [employer] Interactive Patient Education ©2016 [employer]            PATIENT/FAMILY/RESPONSIBLE PARTY VERBALIZES UNDERSTANDING OF ABOVE EDUCATION.  COPY OF PAIN SCALE GIVEN AND REVIEWED WITH VERBALIZED  UNDERSTANDING.

## 2020-10-26 NOTE — PROGRESS NOTES
Chief Complaint   Patient presents with   • Surgical Clearance     Patient stated she did not pass EKG and needs approval        Subjective   Paradise Whitman is a 80 y.o.  female who presents today for surgical clearance.    HPI:  Patient underwent preop testing at [facility] today and was urgently referred to our office for abnormal EKG.  Upon questioning patient, she has been asymptomatic.  She was unaware that her heart had an abnormal rhythm.  She was scheduled to undergo excision of a skin cancer by Dr. Vallecillo on her face.    Paradise Whitman  has a past medical history of Anxiety, Arthritis, Depression, Hypertension, Kidney stone, PONV (postoperative nausea and vomiting), and Tremor.    Allergies   Allergen Reactions   • Hydrocodone Swelling   • Macrobid [Nitrofurantoin] Unknown (See Comments)     Ask pt   • Bactrim [Sulfamethoxazole-Trimethoprim] Nausea Only   • Sulfa Antibiotics Nausea Only       Current Outpatient Medications:   •  azelastine (ASTELIN) 0.1 % nasal spray, 2 sprays into the nostril(s) as directed by provider 2 (Two) Times a Day. Use in each nostril as directed, Disp: 1 each, Rfl: 12  •  divalproex (DEPAKOTE) 250 MG DR tablet, Take 1 tablet by mouth 2 (Two) Times a Day., Disp: 180 tablet, Rfl: 1  •  lidocaine (LIDODERM) 5 %, APPLY ONE PATCH TO EACH KNEE ONCE A DAY, Disp: 60 each, Rfl: 2  •  mometasone (NASONEX) 50 MCG/ACT nasal spray, 2 sprays into the nostril(s) as directed by provider Daily., Disp: 1 each, Rfl: 12  •  mupirocin (BACTROBAN) 2 % ointment, Apply  topically to the appropriate area as directed 2 (Two) Times a Day., Disp: 30 g, Rfl: 2  •  nystatin (MYCOSTATIN) 349278 UNIT/GM cream, Apply to rash under breasts twice daily until healed., Disp: 30 g, Rfl: 2  •  ondansetron ODT (ZOFRAN ODT) 4 MG disintegrating tablet, Take 1 tablet by mouth Every 8 (Eight) Hours As Needed for Nausea., Disp: 30 tablet, Rfl: 1  •  sertraline (ZOLOFT) 100 MG tablet, TAKE ONE AND ONE-HALF TABLETS BY MOUTH EVERY  DAY, Disp: 135 tablet, Rfl: 1  •  silver sulfadiazine (Silvadene) 1 % cream, Apply to open area on buttock twice daily until healed., Disp: 50 g, Rfl: 2  •  traMADol (ULTRAM) 50 MG tablet, Take 1 tablet by mouth 3 (Three) Times a Day. PRN, Disp: 270 tablet, Rfl: 0  •  valsartan-hydrochlorothiazide (DIOVAN HCT) 160-12.5 MG per tablet, Take 1 tablet by mouth Daily., Disp: 90 tablet, Rfl: 1  •  apixaban (ELIQUIS) 5 MG tablet tablet, Take 1 tablet by mouth Every 12 (Twelve) Hours., Disp: 56 tablet, Rfl: 0  Past Medical History:   Diagnosis Date   • Anxiety    • Arthritis    • Depression    • Hypertension    • Kidney stone    • PONV (postoperative nausea and vomiting)    • Tremor      Past Surgical History:   Procedure Laterality Date   • BREAST SURGERY     • CHOLECYSTECTOMY     • EYE SURGERY      CATARACT REMOVAL   • HYSTERECTOMY       Social History     Socioeconomic History   • Marital status:      Spouse name: Not on file   • Number of children: Not on file   • Years of education: Not on file   • Highest education level: Not on file   Tobacco Use   • Smoking status: Never Smoker   • Smokeless tobacco: Never Used   Substance and Sexual Activity   • Alcohol use: No     Frequency: Never   • Drug use: No   • Sexual activity: Defer     Family History   Problem Relation Age of Onset   • No Known Problems Mother    • No Known Problems Father    • No Known Problems Maternal Grandmother    • No Known Problems Maternal Grandfather    • No Known Problems Paternal Grandmother    • No Known Problems Paternal Grandfather        Family history, surgical history, past medical history, Allergies and med's reviewed with patient today and updated in [facility] EMR.     ROS:  Review of Systems   Constitutional: Negative for fatigue, fever and unexpected weight change.   HENT: Negative.  Negative for facial swelling, sore throat and trouble swallowing.    Eyes: Negative.  Negative for photophobia, discharge and visual disturbance.    Respiratory: Negative.  Negative for cough, chest tightness and shortness of breath.    Cardiovascular: Negative.  Negative for chest pain and palpitations.   Gastrointestinal: Negative.  Negative for abdominal pain, diarrhea, nausea and vomiting.   Endocrine: Negative.  Negative for polydipsia, polyphagia and polyuria.   Genitourinary: Negative.  Negative for dysuria, flank pain and frequency.   Musculoskeletal: Positive for arthralgias, gait problem and joint swelling. Negative for back pain and neck pain.   Skin: Negative.  Negative for rash.   Allergic/Immunologic: Negative.    Neurological: Negative for dizziness, light-headedness and headaches.   Hematological: Negative.    Psychiatric/Behavioral: Negative.  Negative for self-injury and suicidal ideas.       OBJECTIVE:  Vitals:    10/26/20 1423   BP: 144/80   BP Location: Right arm   Patient Position: Sitting   Cuff Size: Adult   Pulse: 77   Temp: 97.1 °F (36.2 °C)   TempSrc: Temporal   SpO2: 98%     Physical Exam  Vitals signs and nursing note reviewed.   Constitutional:       General: She is not in acute distress.     Appearance: Normal appearance. She is well-developed. She is obese. She is not diaphoretic.   HENT:      Head: Normocephalic and atraumatic.   Eyes:      Conjunctiva/sclera: Conjunctivae normal.      Pupils: Pupils are equal, round, and reactive to light.   Neck:      Musculoskeletal: Normal range of motion and neck supple.   Cardiovascular:      Rate and Rhythm: Normal rate and regular rhythm.      Heart sounds: Normal heart sounds. No murmur.   Pulmonary:      Effort: Pulmonary effort is normal. No respiratory distress.      Breath sounds: Normal breath sounds.   Abdominal:      General: Bowel sounds are normal. There is no distension.      Palpations: Abdomen is soft.      Tenderness: There is no abdominal tenderness.   Musculoskeletal: Normal range of motion.         General: Swelling and tenderness present.      Comments: Bilateral knee  pain (chronic).   Skin:     General: Skin is warm and dry.      Capillary Refill: Capillary refill takes less than 2 seconds.      Findings: No erythema.   Neurological:      General: No focal deficit present.      Mental Status: She is alert and oriented to person, place, and time.   Psychiatric:         Mood and Affect: Mood normal.         Behavior: Behavior normal.         Thought Content: Thought content normal.         Judgment: Judgment normal.         ASSESSMENT/ PLAN:    Diagnoses and all orders for this visit:    1. New onset atrial fibrillation (CMS/HCC) (Primary)  -     Ambulatory Referral to Cardiology  -     apixaban (ELIQUIS) 5 MG tablet tablet; Take 1 tablet by mouth Every 12 (Twelve) Hours.  Dispense: 56 tablet; Refill: 0    2. Primary osteoarthritis of both knees    3. Morbid (severe) obesity due to excess calories (CMS/HCC)    4. Essential hypertension    Dr. Vallecillo's office notified that clearance will need to be from cardiology, urgent referral placed.    Orders Placed Today:     New Medications Ordered This Visit   Medications   • apixaban (ELIQUIS) 5 MG tablet tablet     Sig: Take 1 tablet by mouth Every 12 (Twelve) Hours.     Dispense:  56 tablet     Refill:  0        Management Plan:     An After Visit Summary was printed and given to the patient at discharge.    Follow-up: Return in about 4 weeks (around 11/23/2020) for Recheck.    Lisa Ruiz, TERESO 10/26/2020 17:05 CDT  This note was electronically signed.

## 2020-10-27 DIAGNOSIS — G89.29 CHRONIC PAIN OF BOTH KNEES: ICD-10-CM

## 2020-10-27 DIAGNOSIS — M25.562 CHRONIC PAIN OF BOTH KNEES: ICD-10-CM

## 2020-10-27 DIAGNOSIS — M25.561 CHRONIC PAIN OF BOTH KNEES: ICD-10-CM

## 2020-10-27 RX ORDER — LIDOCAINE 50 MG/G
1 PATCH TOPICAL EVERY 24 HOURS
Qty: 90 EACH | Refills: 2 | Status: SHIPPED | OUTPATIENT
Start: 2020-10-27 | End: 2021-04-08 | Stop reason: SDUPTHER

## 2020-10-27 RX ORDER — SERTRALINE HYDROCHLORIDE 100 MG/1
150 TABLET, FILM COATED ORAL DAILY
Qty: 135 TABLET | Refills: 1 | Status: SHIPPED | OUTPATIENT
Start: 2020-10-27 | End: 2021-03-24

## 2020-10-27 RX ORDER — DIVALPROEX SODIUM 250 MG/1
250 TABLET, DELAYED RELEASE ORAL 2 TIMES DAILY
Qty: 180 TABLET | Refills: 1 | Status: SHIPPED | OUTPATIENT
Start: 2020-10-27 | End: 2020-12-01

## 2020-10-27 RX ORDER — TRAMADOL HYDROCHLORIDE 50 MG/1
50 TABLET ORAL 3 TIMES DAILY
Qty: 270 TABLET | Refills: 0 | Status: SHIPPED | OUTPATIENT
Start: 2020-10-27 | End: 2020-12-01

## 2020-10-27 RX ORDER — VALSARTAN AND HYDROCHLOROTHIAZIDE 160; 12.5 MG/1; MG/1
1 TABLET, FILM COATED ORAL DAILY
Qty: 90 TABLET | Refills: 1 | Status: SHIPPED | OUTPATIENT
Start: 2020-10-27 | End: 2020-12-01

## 2020-10-27 NOTE — TELEPHONE ENCOUNTER
Caller: [facility] - [place] [phone] [facility] [phone] FX    Relationship: Pharmacy    Medication needed:   Requested Prescriptions     Pending Prescriptions Disp Refills   • lidocaine (LIDODERM) 5 % 60 each 2     Sig: Remove & Discard patch within 12 hours or as directed by MD   • traMADol (ULTRAM) 50 MG tablet 270 tablet 0     Sig: Take 1 tablet by mouth 3 (Three) Times a Day. PRN   • valsartan-hydrochlorothiazide (Diovan HCT) 160-12.5 MG per tablet 90 tablet 1     Sig: Take 1 tablet by mouth Daily.   • divalproex (DEPAKOTE) 250 MG DR tablet 180 tablet 1     Sig: Take 1 tablet by mouth 2 (Two) Times a Day.   • sertraline (ZOLOFT) 100 MG tablet 135 tablet 1     Sig: Take 1.5 tablets by mouth Daily.       What is the patient's preferred pharmacy: [facility] - [facility] [address] [place], [place] [id number] [facility] [phone] FX

## 2020-11-19 ENCOUNTER — OFFICE VISIT (OUTPATIENT)
Dept: CARDIOLOGY | Facility: CLINIC | Age: 80
End: 2020-11-19

## 2020-11-19 VITALS
BODY MASS INDEX: 41.68 KG/M2 | SYSTOLIC BLOOD PRESSURE: 126 MMHG | OXYGEN SATURATION: 98 % | WEIGHT: 275 LBS | HEART RATE: 70 BPM | HEIGHT: 68 IN | DIASTOLIC BLOOD PRESSURE: 70 MMHG

## 2020-11-19 DIAGNOSIS — I10 ESSENTIAL HYPERTENSION: ICD-10-CM

## 2020-11-19 DIAGNOSIS — I48.0 PAROXYSMAL ATRIAL FIBRILLATION (HCC): Primary | ICD-10-CM

## 2020-11-19 PROCEDURE — 99204 OFFICE O/P NEW MOD 45 MIN: CPT | Performed by: INTERNAL MEDICINE

## 2020-11-19 PROCEDURE — 93000 ELECTROCARDIOGRAM COMPLETE: CPT | Performed by: INTERNAL MEDICINE

## 2020-11-19 RX ORDER — MULTIPLE VITAMINS W/ MINERALS TAB 9MG-400MCG
1 TAB ORAL DAILY
COMMUNITY

## 2020-11-19 NOTE — PROGRESS NOTES
Subjective:     Encounter Date:11/19/2020      Patient ID: Paradise Whitman is a 80 y.o. female who was recently incidentally diagnosed with atrial fibrillation, also with a history of hypertension, referred here to establish care and for further evaluation.    Chief Complaint: Recent diagnosis of atrial fibrillation, here to establish care    This is an 80-year-old female who was recently diagnosed with atrial fibrillation on a preoperative EKG, referred here to establish care for further evaluation.  The patient says that she has been unaware of any diagnosis of atrial fibrillation previously.  Even at the time of diagnosis, she was asymptomatic and remains asymptomatic.  No palpitations.  She describes mild chronic shortness of breath and dyspnea on exertion.  Her mobility is also severely limited due to orthopedic issues and musculoskeletal issues.  She does not have lightheadedness, dizziness or syncope.  No chest discomfort.  No orthopnea, PND or any significant edema.  She has been placed on anticoagulant therapy.  So far, she has tolerated this well with no significant bleeding problems.  The patient does have a history of hypertension.  Her blood pressure is well controlled at this time.  She has never had any other personal history of heart disease including rheumatic fever, coronary artery disease, heart failure.  No prior stroke.    Atrial Fibrillation  Presents for initial visit. Onset time: unknown. Symptoms include hemodynamic instability, shortness of breath and weakness. Symptoms are negative for chest pain, dizziness, hypotension, palpitations and syncope. The symptoms have been stable. Past treatments include anticoagulant. Compliance with prior treatments has been good. Past medical history includes atrial fibrillation and HTN. There is no history of CAD, CHF and valvular heart disease.   Hypertension  This is a chronic problem. The problem is unchanged. The problem is controlled. Associated  symptoms include malaise/fatigue and shortness of breath. Pertinent negatives include no blurred vision, chest pain, headaches, neck pain, orthopnea, palpitations, peripheral edema or PND. There are no associated agents to hypertension. Risk factors for coronary artery disease include sedentary lifestyle, obesity and post-menopausal state. Current antihypertension treatment includes angiotensin blockers and diuretics. The current treatment provides significant improvement. There are no compliance problems.  There is no history of angina, CAD/MI, CVA or heart failure.     The following portions of the patient's history were reviewed and updated as appropriate: allergies, current medications, past family history, past medical history, past social history, past surgical history and problem list.     Past Medical History:   Diagnosis Date   • Anxiety    • Arthritis    • Depression    • Hypertension    • Kidney stone    • PONV (postoperative nausea and vomiting)    • Tremor      Past Surgical History:   Procedure Laterality Date   • BREAST SURGERY     • CHOLECYSTECTOMY     • EYE SURGERY      CATARACT REMOVAL   • HYSTERECTOMY         Current Outpatient Medications:   •  apixaban (ELIQUIS) 5 MG tablet tablet, Take 1 tablet by mouth Every 12 (Twelve) Hours., Disp: 56 tablet, Rfl: 0  •  azelastine (ASTELIN) 0.1 % nasal spray, 2 sprays into the nostril(s) as directed by provider 2 (Two) Times a Day. Use in each nostril as directed, Disp: 1 each, Rfl: 12  •  Boswellia-Glucosamine-Vit D (OSTEO BI-FLEX ONE PER DAY PO), Take 1 tablet by mouth Daily., Disp: , Rfl:   •  divalproex (DEPAKOTE) 250 MG DR tablet, Take 1 tablet by mouth 2 (Two) Times a Day., Disp: 180 tablet, Rfl: 1  •  ECHINACEA PO, Take 1,200 mg by mouth Daily., Disp: , Rfl:   •  Hemp Oil-Vanillyl Butyl Ether (ProVent Hemp Pain Relief) 3-1 % patch, Apply 40 mg topically As Needed., Disp: , Rfl:   •  lidocaine (LIDODERM) 5 %, Place 1 patch on the skin as directed by  provider Daily. Remove & Discard patch within 12 hours or as directed by MD, Disp: 90 each, Rfl: 2  •  mometasone (NASONEX) 50 MCG/ACT nasal spray, 2 sprays into the nostril(s) as directed by provider Daily., Disp: 1 each, Rfl: 12  •  multivitamin with minerals (CENTRUM ULTRA WOMENS PO), Take 1 tablet by mouth Daily., Disp: , Rfl:   •  mupirocin (BACTROBAN) 2 % ointment, Apply  topically to the appropriate area as directed 2 (Two) Times a Day., Disp: 30 g, Rfl: 2  •  nystatin (MYCOSTATIN) 394058 UNIT/GM cream, Apply to rash under breasts twice daily until healed., Disp: 30 g, Rfl: 2  •  ondansetron ODT (ZOFRAN ODT) 4 MG disintegrating tablet, Take 1 tablet by mouth Every 8 (Eight) Hours As Needed for Nausea., Disp: 30 tablet, Rfl: 1  •  sertraline (ZOLOFT) 100 MG tablet, Take 1.5 tablets by mouth Daily., Disp: 135 tablet, Rfl: 1  •  silver sulfadiazine (Silvadene) 1 % cream, Apply to open area on buttock twice daily until healed., Disp: 50 g, Rfl: 2  •  traMADol (ULTRAM) 50 MG tablet, Take 1 tablet by mouth 3 (Three) Times a Day. PRN, Disp: 270 tablet, Rfl: 0  •  valsartan-hydrochlorothiazide (Diovan HCT) 160-12.5 MG per tablet, Take 1 tablet by mouth Daily., Disp: 90 tablet, Rfl: 1    Allergies   Allergen Reactions   • Hydrocodone Swelling   • Macrobid [Nitrofurantoin] Unknown (See Comments)     Ask pt   • Bactrim [Sulfamethoxazole-Trimethoprim] Nausea Only   • Sulfa Antibiotics Nausea Only     Social History     Tobacco Use   • Smoking status: Never Smoker   • Smokeless tobacco: Never Used   Substance Use Topics   • Alcohol use: No     Frequency: Never     Family History   Problem Relation Age of Onset   • No Known Problems Mother    • No Known Problems Father    • No Known Problems Maternal Grandmother    • No Known Problems Maternal Grandfather    • No Known Problems Paternal Grandmother    • No Known Problems Paternal Grandfather      Review of Systems   Constitution: Positive for malaise/fatigue. Negative for  chills, fever, night sweats and weight loss.   HENT: Negative for congestion and hearing loss.    Eyes: Negative for blurred vision and pain.   Cardiovascular: Positive for dyspnea on exertion. Negative for chest pain, claudication, leg swelling, orthopnea, palpitations, paroxysmal nocturnal dyspnea and syncope.   Respiratory: Positive for shortness of breath. Negative for cough, hemoptysis and wheezing.    Endocrine: Negative for cold intolerance and heat intolerance.   Hematologic/Lymphatic: Negative for adenopathy and bleeding problem. Does not bruise/bleed easily.   Skin: Negative for color change, poor wound healing and rash.   Musculoskeletal: Positive for arthritis and muscle weakness. Negative for myalgias and neck pain.   Gastrointestinal: Negative for abdominal pain, change in bowel habit, heartburn, hematochezia, melena, nausea and vomiting.   Genitourinary: Negative for dysuria, frequency, hematuria and nocturia.   Neurological: Positive for weakness. Negative for dizziness, focal weakness, headaches, light-headedness, loss of balance and numbness.   Psychiatric/Behavioral: Negative for altered mental status, memory loss and substance abuse.   Allergic/Immunologic: Negative for hives and persistent infections.       ECG 12 Lead    Date/Time: 11/19/2020 2:37 PM  Performed by: Jung Valadez MD  Authorized by: Jung Valadez MD   Rhythm: atrial fibrillation  Rate: normal  BPM: 86  Conduction: conduction normal  QRS axis: normal  Other findings: non-specific ST-T wave changes    Clinical impression: abnormal EKG             Objective:     Vitals signs reviewed.   Constitutional:       General: Not in acute distress.     Appearance: Normal appearance. Well-developed. Chronically ill-appearing. Not toxic-appearing or diaphoretic.   Eyes:      General: Lids are normal.      Extraocular Movements: Extraocular movements intact.      Pupils: Pupils are equal, round, and reactive to light.  "  HENT:      Head: Normocephalic and atraumatic.      Right Ear: External ear normal.      Left Ear: External ear normal.      Nose: Nose normal.    Mouth/Throat:      Mouth: Mucous membranes are not pale, not dry and not cyanotic.      Pharynx: Uvula midline. No oropharyngeal exudate.   Neck:      Musculoskeletal: Normal range of motion and neck supple. No edema.      Thyroid: No thyroid mass or thyromegaly.      Vascular: No carotid bruit, hepatojugular reflux or JVD.      Trachea: No tracheal deviation.      Lymphadenopathy: No cervical adenopathy.   Pulmonary:      Effort: Pulmonary effort is normal. No accessory muscle usage or respiratory distress.      Breath sounds: Normal breath sounds. No wheezing. No rhonchi. No rales.   Chest:      Chest wall: Not tender to palpatation.   Cardiovascular:      Normal rate. Irregularly irregular rhythm.      Murmurs: There is no murmur.      No gallop.   Pulses:     Intact distal pulses.   Edema:     Peripheral edema absent.   Abdominal:      General: Bowel sounds are normal. There is no distension or abdominal bruit.      Palpations: Abdomen is soft. There is no pulsatile midline mass.      Tenderness: There is no abdominal tenderness.   Musculoskeletal: Normal range of motion.         General: No tenderness or deformity.      Extremities: No clubbing present.  Skin:     General: Skin is warm and dry. There is no cyanosis.      Findings: No erythema or rash.   Neurological:      General: No focal deficit present.      Mental Status: Oriented to person, place, and time and oriented to person, place and time.      Cranial Nerves: No cranial nerve deficit.   Psychiatric:         Attention and Perception: Attention normal.         Mood and Affect: Mood normal.         Speech: Speech normal.         Behavior: Behavior normal. Behavior is cooperative.         Thought Content: Thought content normal.       /70   Pulse 70   Ht 172.7 cm (68\")   Wt 125 kg (275 lb)   LMP  " (LMP Unknown)   SpO2 98%   BMI 41.81 kg/m²     Data/Lab Review:     Lab Results   Component Value Date    WBC 5.91 10/26/2020    HGB 14.8 10/26/2020    HCT 44.0 10/26/2020    MCV 95.9 10/26/2020     10/26/2020     Lab Results   Component Value Date    GLUCOSE 109 (H) 10/26/2020    CALCIUM 9.2 10/26/2020     10/26/2020    K 3.8 10/26/2020    CO2 32.0 (H) 10/26/2020     10/26/2020    BUN 24 (H) 10/26/2020    CREATININE 0.71 10/26/2020    EGFRIFAFRI 94 11/21/2019    EGFRIFNONA 79 10/26/2020    BCR 33.8 (H) 10/26/2020    ANIONGAP 8.0 10/26/2020         Assessment:          Diagnosis Plan   1. Paroxysmal atrial fibrillation (CMS/HCC)  Adult Transthoracic Echo Complete W/ Cont if Necessary Per Protocol    ECG 12 Lead   2. Essential hypertension          Plan:       1.  Paroxysmal atrial fibrillation: The patient is asymptomatic and rate controlled at this time.  She is also anticoagulated and tolerating this well.  Given her risk profile, I think that she is an appropriate candidate for long-term use of anticoagulation.  We discussed the possibility of cardioversion to try to restore sinus rhythm at this time but the patient is not inclined to do this, especially given that she is asymptomatic.  This is reasonable to allow her to stay in rate controlled atrial fibrillation as long she remains asymptomatic.  We will check an echocardiogram, however to assure that she does not have significant mitral valve stenosis or anything that might prohibit her from using a novel anticoagulant.  We will also plan to see her back in 6 months unless otherwise needed sooner.    2.  Essential hypertension: Blood pressure is currently well controlled.  Continue current medications without change.    Patient's Body mass index is 41.81 kg/m². BMI is above normal parameters. Recommendations include: exercise counseling and nutrition counseling.    Follow-up in 6 months unless otherwise needed sooner.

## 2020-11-20 ENCOUNTER — OFFICE VISIT (OUTPATIENT)
Dept: FAMILY MEDICINE CLINIC | Facility: CLINIC | Age: 80
End: 2020-11-20

## 2020-11-20 VITALS
SYSTOLIC BLOOD PRESSURE: 111 MMHG | OXYGEN SATURATION: 98 % | HEART RATE: 93 BPM | DIASTOLIC BLOOD PRESSURE: 76 MMHG | TEMPERATURE: 98.1 F

## 2020-11-20 DIAGNOSIS — I48.91 NEW ONSET ATRIAL FIBRILLATION (HCC): ICD-10-CM

## 2020-11-20 DIAGNOSIS — B37.0 THRUSH, ORAL: Primary | ICD-10-CM

## 2020-11-20 DIAGNOSIS — I48.91 ATRIAL FIBRILLATION, UNSPECIFIED TYPE (HCC): ICD-10-CM

## 2020-11-20 PROCEDURE — 99213 OFFICE O/P EST LOW 20 MIN: CPT | Performed by: NURSE PRACTITIONER

## 2020-11-20 NOTE — PROGRESS NOTES
Chief Complaint   Patient presents with   • Follow-up        Subjective   Paradise Whitman is a 80 y.o.  female who presents today for follow up.    HPI: Complains of acute sore, raw tongue, has been going on for for about a month, has progressively been getting worse, pain bothers her all day and night.  She admits that it feels better only with drinking liquids or chewing gum.  Describes that she has some shortness of breath but she correlates this with when she is in pain related to her legs. She says she always fatigued. She reports no chest pain or palpations. She says she drinks 24 oz of liquid daily.     Paradise Whitman  has a past medical history of Anxiety, Arthritis, Depression, Hypertension, Kidney stone, PONV (postoperative nausea and vomiting), and Tremor.    Allergies   Allergen Reactions   • Hydrocodone Swelling   • Macrobid [Nitrofurantoin] Unknown (See Comments)     Ask pt   • Bactrim [Sulfamethoxazole-Trimethoprim] Nausea Only   • Sulfa Antibiotics Nausea Only       Current Outpatient Medications:   •  apixaban (ELIQUIS) 5 MG tablet tablet, Take 1 tablet by mouth Every 12 (Twelve) Hours., Disp: 60 tablet, Rfl: 2  •  azelastine (ASTELIN) 0.1 % nasal spray, 2 sprays into the nostril(s) as directed by provider 2 (Two) Times a Day. Use in each nostril as directed, Disp: 1 each, Rfl: 12  •  Boswellia-Glucosamine-Vit D (OSTEO BI-FLEX ONE PER DAY PO), Take 1 tablet by mouth Daily., Disp: , Rfl:   •  divalproex (DEPAKOTE) 250 MG DR tablet, Take 1 tablet by mouth 2 (Two) Times a Day., Disp: 180 tablet, Rfl: 1  •  ECHINACEA PO, Take 1,200 mg by mouth Daily., Disp: , Rfl:   •  Hemp Oil-Vanillyl Butyl Ether (ProVent Hemp Pain Relief) 3-1 % patch, Apply 40 mg topically As Needed., Disp: , Rfl:   •  lidocaine (LIDODERM) 5 %, Place 1 patch on the skin as directed by provider Daily. Remove & Discard patch within 12 hours or as directed by MD, Disp: 90 each, Rfl: 2  •  mometasone (NASONEX) 50 MCG/ACT nasal spray,  2 sprays into the nostril(s) as directed by provider Daily., Disp: 1 each, Rfl: 12  •  multivitamin with minerals (CENTRUM ULTRA WOMENS PO), Take 1 tablet by mouth Daily., Disp: , Rfl:   •  mupirocin (BACTROBAN) 2 % ointment, Apply  topically to the appropriate area as directed 2 (Two) Times a Day., Disp: 30 g, Rfl: 2  •  nystatin (MYCOSTATIN) 974424 UNIT/GM cream, Apply to rash under breasts twice daily until healed., Disp: 30 g, Rfl: 2  •  ondansetron ODT (ZOFRAN ODT) 4 MG disintegrating tablet, Take 1 tablet by mouth Every 8 (Eight) Hours As Needed for Nausea., Disp: 30 tablet, Rfl: 1  •  sertraline (ZOLOFT) 100 MG tablet, Take 1.5 tablets by mouth Daily., Disp: 135 tablet, Rfl: 1  •  silver sulfadiazine (Silvadene) 1 % cream, Apply to open area on buttock twice daily until healed., Disp: 50 g, Rfl: 2  •  traMADol (ULTRAM) 50 MG tablet, Take 1 tablet by mouth 3 (Three) Times a Day. PRN, Disp: 270 tablet, Rfl: 0  •  valsartan-hydrochlorothiazide (Diovan HCT) 160-12.5 MG per tablet, Take 1 tablet by mouth Daily., Disp: 90 tablet, Rfl: 1  •  nystatin (MYCOSTATIN) 390544 UNIT/ML suspension, Swish and swallow 5 mL 4 (Four) Times a Day., Disp: 240 mL, Rfl: 0  Past Medical History:   Diagnosis Date   • Anxiety    • Arthritis    • Depression    • Hypertension    • Kidney stone    • PONV (postoperative nausea and vomiting)    • Tremor      Past Surgical History:   Procedure Laterality Date   • BREAST SURGERY     • CHOLECYSTECTOMY     • EYE SURGERY      CATARACT REMOVAL   • HYSTERECTOMY       Social History     Socioeconomic History   • Marital status:      Spouse name: Not on file   • Number of children: Not on file   • Years of education: Not on file   • Highest education level: Not on file   Tobacco Use   • Smoking status: Never Smoker   • Smokeless tobacco: Never Used   Substance and Sexual Activity   • Alcohol use: No     Frequency: Never   • Drug use: No   • Sexual activity: Defer     Family History   Problem  Relation Age of Onset   • No Known Problems Mother    • No Known Problems Father    • No Known Problems Maternal Grandmother    • No Known Problems Maternal Grandfather    • No Known Problems Paternal Grandmother    • No Known Problems Paternal Grandfather        Family history, surgical history, past medical history, Allergies and med's reviewed with patient today and updated in [facility] EMR.     ROS:  Review of Systems   Constitutional: Positive for fatigue.   HENT: Positive for mouth sores and postnasal drip.    Eyes: Negative.    Respiratory: Positive for shortness of breath.         When in pain   Cardiovascular: Negative.    Gastrointestinal: Negative.    Endocrine: Negative.    Genitourinary: Negative.    Musculoskeletal: Positive for gait problem and joint swelling.   Skin: Negative.    Allergic/Immunologic: Negative.    Hematological: Negative.    Psychiatric/Behavioral: Negative.        OBJECTIVE:  Vitals:    11/20/20 1403   BP: 111/76   BP Location: Right arm   Patient Position: Sitting   Cuff Size: Large Adult   Pulse: 93   Temp: 98.1 °F (36.7 °C)   SpO2: 98%     Physical Exam  Vitals signs and nursing note reviewed.   Constitutional:       Appearance: Normal appearance. She is obese.   HENT:      Head: Normocephalic and atraumatic.      Nose: Congestion present.      Mouth/Throat:      Mouth: Mucous membranes are dry. Oral lesions present.      Dentition: Dental caries present.      Tongue: No lesions.      Palate: Lesions present.      Pharynx: Uvula midline. Posterior oropharyngeal erythema present.     Eyes:      Pupils: Pupils are equal, round, and reactive to light.   Neck:      Musculoskeletal: Normal range of motion and neck supple. No neck rigidity or muscular tenderness.   Cardiovascular:      Rate and Rhythm: Normal rate and regular rhythm.      Pulses: Normal pulses.      Heart sounds: Normal heart sounds.   Pulmonary:      Effort: Pulmonary effort is normal. No respiratory distress.      Breath  sounds: Normal breath sounds. No stridor. No wheezing, rhonchi or rales.   Chest:      Chest wall: No tenderness.   Musculoskeletal:         General: Swelling and tenderness present.      Right lower leg: Edema present.      Left lower leg: Edema present.   Skin:     General: Skin is warm and dry.      Capillary Refill: Capillary refill takes 2 to 3 seconds.   Neurological:      General: No focal deficit present.      Mental Status: She is alert and oriented to person, place, and time. Mental status is at baseline.   Psychiatric:         Mood and Affect: Mood normal.         Behavior: Behavior normal.         Thought Content: Thought content normal.         Judgment: Judgment normal.         ASSESSMENT/ PLAN:    Diagnoses and all orders for this visit:    1. Thrush, oral (Primary)  -     Discontinue: nystatin (MYCOSTATIN) 177378 UNIT/ML suspension; Swish and swallow 5 mL 4 (Four) Times a Day.  Dispense: 240 mL; Refill: 0  -     nystatin (MYCOSTATIN) [id number] UNIT/ML suspension; Swish and swallow 5 mL 4 (Four) Times a Day.  Dispense: 240 mL; Refill: 0    2. New onset atrial fibrillation (CMS/HCC)  -     apixaban (ELIQUIS) 5 MG tablet tablet; Take 1 tablet by mouth Every 12 (Twelve) Hours.  Dispense: 60 tablet; Refill: 2    3. Atrial fibrillation, unspecified type (CMS/HCC)  -     Discontinue: nystatin (MYCOSTATIN) 122442 UNIT/ML suspension; Swish and swallow 5 mL 4 (Four) Times a Day.  Dispense: 240 mL; Refill: 0        Orders Placed Today:     New Medications Ordered This Visit   Medications   • apixaban (ELIQUIS) 5 MG tablet tablet     Sig: Take 1 tablet by mouth Every 12 (Twelve) Hours.     Dispense:  60 tablet     Refill:  2     Order Specific Question:   Quantity     Answer:   56   • nystatin (MYCOSTATIN) 611835 UNIT/ML suspension     Sig: Swish and swallow 5 mL 4 (Four) Times a Day.     Dispense:  240 mL     Refill:  0        Management Plan:     An After Visit Summary was printed and given to the patient at  discharge.    Follow-up: Return in about 3 months (around 2/20/2021) for Next scheduled follow up.    Lisa Ruiz, APRN 11/20/2020 15:12 CST  This note was electronically signed.

## 2020-11-23 DIAGNOSIS — I48.91 NEW ONSET ATRIAL FIBRILLATION (HCC): ICD-10-CM

## 2020-11-23 NOTE — TELEPHONE ENCOUNTER
Caller: PekalinParadise    Relationship: Self    Best call back number: [phone]      Medication needed:   Requested Prescriptions     Pending Prescriptions Disp Refills   • apixaban (ELIQUIS) 5 MG tablet tablet 60 tablet 2     Sig: Take 1 tablet by mouth Every 12 (Twelve) Hours.       When do you need the refill by: ASAP  PREFER 3 MO SUPPLY      Does the patient have less than a 3 day supply:  [x] Yes  [] No    What is the patient's preferred pharmacy: [facility] [address] [phone] [facility] [phone]

## 2020-11-30 DIAGNOSIS — G89.29 CHRONIC PAIN OF BOTH KNEES: ICD-10-CM

## 2020-11-30 DIAGNOSIS — M25.561 CHRONIC PAIN OF BOTH KNEES: ICD-10-CM

## 2020-11-30 DIAGNOSIS — M25.562 CHRONIC PAIN OF BOTH KNEES: ICD-10-CM

## 2020-12-01 RX ORDER — VALSARTAN/HYDROCHLOROTHIAZIDE 160-12.5MG
TABLET ORAL
Qty: 90 TABLET | Refills: 1 | Status: SHIPPED | OUTPATIENT
Start: 2020-12-01 | End: 2021-04-08 | Stop reason: SDUPTHER

## 2020-12-01 RX ORDER — DIVALPROEX SODIUM 250 MG/1
TABLET, DELAYED RELEASE ORAL
Qty: 180 TABLET | Refills: 1 | Status: SHIPPED | OUTPATIENT
Start: 2020-12-01 | End: 2021-04-08

## 2020-12-01 RX ORDER — TRAMADOL HYDROCHLORIDE 50 MG/1
TABLET ORAL
Qty: 270 TABLET | Refills: 0 | Status: SHIPPED | OUTPATIENT
Start: 2020-12-01 | End: 2021-03-24

## 2020-12-04 ENCOUNTER — APPOINTMENT (OUTPATIENT)
Dept: CARDIOLOGY | Facility: HOSPITAL | Age: 80
End: 2020-12-04

## 2020-12-17 ENCOUNTER — HOSPITAL ENCOUNTER (OUTPATIENT)
Dept: CARDIOLOGY | Facility: HOSPITAL | Age: 80
Discharge: HOME | End: 2020-12-17
Admitting: INTERNAL MEDICINE

## 2020-12-17 VITALS
WEIGHT: 275 LBS | BODY MASS INDEX: 41.68 KG/M2 | HEIGHT: 68 IN | SYSTOLIC BLOOD PRESSURE: 125 MMHG | DIASTOLIC BLOOD PRESSURE: 70 MMHG

## 2020-12-17 DIAGNOSIS — B37.0 THRUSH, ORAL: ICD-10-CM

## 2020-12-17 DIAGNOSIS — I48.0 PAROXYSMAL ATRIAL FIBRILLATION (HCC): ICD-10-CM

## 2020-12-17 PROCEDURE — 93306 TTE W/DOPPLER COMPLETE: CPT | Performed by: INTERNAL MEDICINE

## 2020-12-17 PROCEDURE — 25010000002 PERFLUTREN 6.52 MG/ML SUSPENSION: Performed by: INTERNAL MEDICINE

## 2020-12-17 PROCEDURE — 93306 TTE W/DOPPLER COMPLETE: CPT

## 2020-12-17 RX ADMIN — PERFLUTREN: 6.52 INJECTION, SUSPENSION INTRAVENOUS at 15:28

## 2020-12-17 NOTE — TELEPHONE ENCOUNTER
Caller: Paradise Whitman    Relationship: Self    Best call back number: [phone]    Medication needed:   Requested Prescriptions     Pending Prescriptions Disp Refills   • nystatin (MYCOSTATIN) 693008 UNIT/ML suspension 240 mL 0     Sig: Swish and swallow 5 mL 4 (Four) Times a Day.       What details did the patient provide when requesting the medication: PT IS CONCERNED THAT [facility] HAS RETURNED, WANTS TO KNOW IF THERE IS SOMETHING DIFFERENT SHE SHOULD BE DOING    Does the patient have less than a 3 day supply:  [x] Yes  [] No    What is the patient's preferred pharmacy: [facility] [address] [phone] [facility] [phone]

## 2020-12-18 LAB
BH CV ECHO MEAS - AO MAX PG (FULL): 3.3 MMHG
BH CV ECHO MEAS - AO MAX PG: 7.6 MMHG
BH CV ECHO MEAS - AO MEAN PG (FULL): 1 MMHG
BH CV ECHO MEAS - AO MEAN PG: 3 MMHG
BH CV ECHO MEAS - AO ROOT AREA (BSA CORRECTED): 1.6
BH CV ECHO MEAS - AO ROOT AREA: 10.8 CM^2
BH CV ECHO MEAS - AO ROOT DIAM: 3.7 CM
BH CV ECHO MEAS - AO V2 MAX: 138 CM/SEC
BH CV ECHO MEAS - AO V2 MEAN: 83.7 CM/SEC
BH CV ECHO MEAS - AO V2 VTI: 22.3 CM
BH CV ECHO MEAS - AVA(I,A): 3.5 CM^2
BH CV ECHO MEAS - AVA(I,D): 3.5 CM^2
BH CV ECHO MEAS - AVA(V,A): 2.9 CM^2
BH CV ECHO MEAS - AVA(V,D): 2.9 CM^2
BH CV ECHO MEAS - BSA(HAYCOCK): 2.5 M^2
BH CV ECHO MEAS - BSA: 2.3 M^2
BH CV ECHO MEAS - BZI_BMI: 41.8 KILOGRAMS/M^2
BH CV ECHO MEAS - BZI_METRIC_HEIGHT: 172.7 CM
BH CV ECHO MEAS - BZI_METRIC_WEIGHT: 124.7 KG
BH CV ECHO MEAS - EDV(CUBED): 82.9 ML
BH CV ECHO MEAS - EDV(MOD-SP4): 94.9 ML
BH CV ECHO MEAS - EDV(TEICH): 85.8 ML
BH CV ECHO MEAS - EF(CUBED): 53.8 %
BH CV ECHO MEAS - EF(MOD-SP4): 69.8 %
BH CV ECHO MEAS - EF(TEICH): 45.9 %
BH CV ECHO MEAS - ESV(CUBED): 38.3 ML
BH CV ECHO MEAS - ESV(MOD-SP4): 28.7 ML
BH CV ECHO MEAS - ESV(TEICH): 46.4 ML
BH CV ECHO MEAS - FS: 22.7 %
BH CV ECHO MEAS - IVS/LVPW: 1
BH CV ECHO MEAS - IVSD: 0.78 CM
BH CV ECHO MEAS - LA DIMENSION: 3 CM
BH CV ECHO MEAS - LA/AO: 0.81
BH CV ECHO MEAS - LAT PEAK E' VEL: 14.7 CM/SEC
BH CV ECHO MEAS - LV DIASTOLIC VOL/BSA (35-75): 40.6 ML/M^2
BH CV ECHO MEAS - LV MASS(C)D: 101.4 GRAMS
BH CV ECHO MEAS - LV MASS(C)DI: 43.3 GRAMS/M^2
BH CV ECHO MEAS - LV MAX PG: 4.3 MMHG
BH CV ECHO MEAS - LV MEAN PG: 2 MMHG
BH CV ECHO MEAS - LV SYSTOLIC VOL/BSA (12-30): 12.3 ML/M^2
BH CV ECHO MEAS - LV V1 MAX: 104 CM/SEC
BH CV ECHO MEAS - LV V1 MEAN: 58.5 CM/SEC
BH CV ECHO MEAS - LV V1 VTI: 20.4 CM
BH CV ECHO MEAS - LVIDD: 4.4 CM
BH CV ECHO MEAS - LVIDS: 3.4 CM
BH CV ECHO MEAS - LVLD AP4: 8.1 CM
BH CV ECHO MEAS - LVLS AP4: 6.3 CM
BH CV ECHO MEAS - LVOT AREA (M): 3.8 CM^2
BH CV ECHO MEAS - LVOT AREA: 3.8 CM^2
BH CV ECHO MEAS - LVOT DIAM: 2.2 CM
BH CV ECHO MEAS - LVPWD: 0.75 CM
BH CV ECHO MEAS - MED PEAK E' VEL: 13.7 CM/SEC
BH CV ECHO MEAS - MV DEC TIME: 0.25 SEC
BH CV ECHO MEAS - MV E MAX VEL: 118 CM/SEC
BH CV ECHO MEAS - SI(AO): 102.5 ML/M^2
BH CV ECHO MEAS - SI(CUBED): 19.1 ML/M^2
BH CV ECHO MEAS - SI(LVOT): 33.1 ML/M^2
BH CV ECHO MEAS - SI(MOD-SP4): 28.3 ML/M^2
BH CV ECHO MEAS - SI(TEICH): 16.8 ML/M^2
BH CV ECHO MEAS - SV(AO): 239.8 ML
BH CV ECHO MEAS - SV(CUBED): 44.6 ML
BH CV ECHO MEAS - SV(LVOT): 77.5 ML
BH CV ECHO MEAS - SV(MOD-SP4): 66.2 ML
BH CV ECHO MEAS - SV(TEICH): 39.4 ML
BH CV ECHO MEAS - TR MAX VEL: 244 CM/SEC
BH CV ECHO MEASUREMENTS AVERAGE E/E' RATIO: 8.31
LEFT ATRIUM VOLUME INDEX: 24.9 ML/M2
LEFT ATRIUM VOLUME: 58.3 CM3
MAXIMAL PREDICTED HEART RATE: 138 BPM
STRESS TARGET HR: 117 BPM

## 2021-01-05 ENCOUNTER — OFFICE VISIT (OUTPATIENT)
Dept: FAMILY MEDICINE CLINIC | Facility: CLINIC | Age: 81
End: 2021-01-05

## 2021-01-05 VITALS
DIASTOLIC BLOOD PRESSURE: 74 MMHG | HEART RATE: 96 BPM | HEIGHT: 67 IN | SYSTOLIC BLOOD PRESSURE: 117 MMHG | OXYGEN SATURATION: 96 % | BODY MASS INDEX: 43.07 KG/M2 | TEMPERATURE: 98 F

## 2021-01-05 DIAGNOSIS — B37.2 CUTANEOUS CANDIDIASIS: ICD-10-CM

## 2021-01-05 DIAGNOSIS — R53.1 GENERAL WEAKNESS: Primary | ICD-10-CM

## 2021-01-05 DIAGNOSIS — J01.01 ACUTE RECURRENT MAXILLARY SINUSITIS: ICD-10-CM

## 2021-01-05 DIAGNOSIS — G89.29 CHRONIC PAIN OF BOTH KNEES: ICD-10-CM

## 2021-01-05 DIAGNOSIS — L89.312 PRESSURE INJURY OF RIGHT BUTTOCK, STAGE 2 (HCC): Primary | ICD-10-CM

## 2021-01-05 DIAGNOSIS — M25.561 CHRONIC PAIN OF BOTH KNEES: ICD-10-CM

## 2021-01-05 DIAGNOSIS — R11.0 NAUSEA: ICD-10-CM

## 2021-01-05 DIAGNOSIS — E66.01 MORBID (SEVERE) OBESITY DUE TO EXCESS CALORIES (HCC): ICD-10-CM

## 2021-01-05 DIAGNOSIS — L89.312 PRESSURE INJURY OF RIGHT BUTTOCK, STAGE 2 (HCC): ICD-10-CM

## 2021-01-05 DIAGNOSIS — M25.562 CHRONIC PAIN OF BOTH KNEES: ICD-10-CM

## 2021-01-05 PROCEDURE — 99214 OFFICE O/P EST MOD 30 MIN: CPT | Performed by: NURSE PRACTITIONER

## 2021-01-05 PROCEDURE — 99213 OFFICE O/P EST LOW 20 MIN: CPT | Performed by: NURSE PRACTITIONER

## 2021-01-05 RX ORDER — ONDANSETRON 4 MG/1
4 TABLET, ORALLY DISINTEGRATING ORAL EVERY 8 HOURS PRN
Qty: 30 TABLET | Refills: 1 | Status: SHIPPED | OUTPATIENT
Start: 2021-01-05

## 2021-01-05 RX ORDER — AMOXICILLIN AND CLAVULANATE POTASSIUM 875; 125 MG/1; MG/1
1 TABLET, FILM COATED ORAL 2 TIMES DAILY
Qty: 20 TABLET | Refills: 0 | Status: SHIPPED | OUTPATIENT
Start: 2021-01-05 | End: 2021-03-10

## 2021-01-05 RX ORDER — NYSTATIN 100000 U/G
CREAM TOPICAL
Qty: 30 G | Refills: 2 | Status: SHIPPED | OUTPATIENT
Start: 2021-01-05 | End: 2021-04-08

## 2021-01-05 NOTE — PROGRESS NOTES
"Chief Complaint  Nausea (Zofran refill), Wound Infection, Sinus Problem, and Knee Pain    Subjective          Paradise Whitman presents to [facility] for   History of Present Illness   KNEE PAIN: patient received a cortisone injection in the left knee, the pain was relieved for 5 days. She is struggling to get around in her home and she says it is almost impossible for her to get in and out of bed. She is requesting a hospital bed.  SINUS INFECTION: patient says she normally knows when she is about to get a sinus infection and this is how she feels, She is experiencing sinus stuffiness, green nasal drainage. Fleam in the back of throat in the mornings, No ear pain, no cough, no fever, no more than usual shortness of breath. She is requesting a prescription for augmentin   WOUND: right inner though, appears to be a stage two, dime sized with white granulation. This has been present for about three weeks now. They have been applying silvadene daily as well as barrier cream.       Objective   Vital Signs:   /74 (BP Location: Right arm, Patient Position: Sitting, Cuff Size: Large Adult)   Pulse 96   Temp 98 °F (36.7 °C)   Ht 170.2 cm (67\") Comment: pt reported  SpO2 96%   BMI 43.07 kg/m²     Physical Exam  Vitals signs and nursing note reviewed.   Constitutional:       Appearance: Normal appearance. She is obese.   HENT:      Head: Normocephalic and atraumatic.   Eyes:      Pupils: Pupils are equal, round, and reactive to light.   Cardiovascular:      Rate and Rhythm: Normal rate and regular rhythm.      Pulses: Normal pulses.      Heart sounds: Normal heart sounds. No murmur. No friction rub. No gallop.    Pulmonary:      Effort: Pulmonary effort is normal. No respiratory distress.      Breath sounds: Normal breath sounds. No stridor. No wheezing, rhonchi or rales.   Chest:      Chest wall: No tenderness.   Musculoskeletal: Normal range of motion.   Skin:     Findings: Lesion " present.      Comments: Present on inner right thigh, dime sized with loss of epidermis, stage 2 with granulation    Neurological:      General: No focal deficit present.      Mental Status: She is alert and oriented to person, place, and time. Mental status is at baseline.   Psychiatric:         Mood and Affect: Mood normal.         Behavior: Behavior normal.         Thought Content: Thought content normal.         Judgment: Judgment normal.        Result Review :                 Assessment and Plan    Problem List Items Addressed This Visit     None      Visit Diagnoses     Pressure injury of right buttock, stage 2 (CMS/HCC)    -  Primary    Relevant Medications    silver sulfadiazine (Silvadene) 1 % cream    nystatin (MYCOSTATIN) 067253 UNIT/GM cream    Cutaneous candidiasis        Relevant Medications    silver sulfadiazine (Silvadene) 1 % cream    nystatin (MYCOSTATIN) 027048 UNIT/GM cream    Nausea        Relevant Medications    ondansetron ODT (Zofran ODT) 4 MG disintegrating tablet    Acute recurrent maxillary sinusitis        Relevant Medications    amoxicillin-clavulanate (Augmentin) 875-125 MG per tablet      Rx for Mepilex border dressings 3x3 or 4x4 phoned to [facility] with directions to apply to affected area q 3 days and PRN for excessive drainage.  1 box with 2 refills.    Follow Up   Return in about 3 months (around 4/5/2021) for Next scheduled follow up.  Patient was given instructions and counseling regarding her condition or for health maintenance advice. Please see specific information pulled into the AVS if appropriate.

## 2021-01-06 NOTE — PROGRESS NOTES
Chief Complaint  Knee Pain, Weakness - Generalized, and Obesity    Subjective          Paradise Whitman presents to [facility] for   History of Present Illness  Patient presents for request of rx for hospital bed.  Due to her end stage OA of both knees and inability to undergo knee replacement surgery, she has trouble getting in and out of bed.  This is complicated by her morbid obesity.  She also has skin breakdown and urinary incontinence.  These problems together place her at an increased risk of fall and further skin breakdown.  She has trouble getting out of bed and turning in bed.  She will have increased independence with an elevated bed and side rails.    Objective   Vital Signs:   There were no vitals taken for this visit.    Physical Exam  Vitals signs and nursing note reviewed.   Constitutional:       General: She is not in acute distress.     Appearance: She is well-developed. She is not diaphoretic.   HENT:      Head: Normocephalic and atraumatic.   Neck:      Musculoskeletal: Normal range of motion and neck supple.   Cardiovascular:      Rate and Rhythm: Normal rate and regular rhythm.      Heart sounds: Normal heart sounds. No murmur.   Pulmonary:      Effort: Pulmonary effort is normal. No respiratory distress.      Breath sounds: Normal breath sounds.   Musculoskeletal: Normal range of motion.         General: Tenderness present.      Right lower leg: Edema present.      Left lower leg: Edema present.      Comments: Bilateral crepitus of knees.  Decreased ROM.  Patient is in wheelchair today.  Bilateral trace lower extremity edema.  Bilateral shoulder pain with AROM/PROM.  Gen upper body weakness noted.   Skin:     General: Skin is warm and dry.      Capillary Refill: Capillary refill takes less than 2 seconds.      Findings: No erythema.      Comments: Skin breakdown of thigh as previously noted.   Neurological:      General: No focal deficit present.      Mental  Status: She is alert and oriented to person, place, and time.      Motor: Weakness present.      Gait: Gait abnormal.      Comments: Generalized weakness and altered gait secondary to severe OA bilateral knees.   Psychiatric:         Mood and Affect: Mood normal.         Behavior: Behavior normal.         Thought Content: Thought content normal.         Judgment: Judgment normal.        Result Review :                 Assessment and Plan    Problem List Items Addressed This Visit        Musculoskeletal and Injuries    Knee pain    Overview     bilateral knees - uses lidoderm patches         Relevant Orders    Hospital Bed      Other Visit Diagnoses     General weakness    -  Primary    Relevant Orders    Hospital Bed    Morbid (severe) obesity due to excess calories (CMS/HCC)        Relevant Orders    Hospital Bed    Pressure injury of right buttock, stage 2 (CMS/HCC)        Relevant Orders    Hospital Bed          Follow Up   Return for keep scheduled appt..  Patient was given instructions and counseling regarding her condition or for health maintenance advice. Please see specific information pulled into the AVS if appropriate.

## 2021-01-07 DIAGNOSIS — L89.312 PRESSURE INJURY OF RIGHT BUTTOCK, STAGE 2 (HCC): Primary | ICD-10-CM

## 2021-01-07 RX ORDER — GAUZE BANDAGE 6" X 6"
BANDAGE TOPICAL
Qty: 4 EACH | Refills: 2 | Status: SHIPPED | OUTPATIENT
Start: 2021-01-07 | End: 2021-03-10

## 2021-02-22 ENCOUNTER — TELEPHONE (OUTPATIENT)
Dept: FAMILY MEDICINE CLINIC | Facility: CLINIC | Age: 81
End: 2021-02-22

## 2021-02-22 DIAGNOSIS — G89.29 CHRONIC PAIN OF BOTH KNEES: Primary | ICD-10-CM

## 2021-02-22 DIAGNOSIS — L89.312 PRESSURE INJURY OF RIGHT BUTTOCK, STAGE 2 (HCC): ICD-10-CM

## 2021-02-22 DIAGNOSIS — M25.561 CHRONIC PAIN OF BOTH KNEES: Primary | ICD-10-CM

## 2021-02-22 DIAGNOSIS — E66.01 MORBID (SEVERE) OBESITY DUE TO EXCESS CALORIES (HCC): ICD-10-CM

## 2021-02-22 DIAGNOSIS — R29.6 FREQUENT FALLS: ICD-10-CM

## 2021-02-22 DIAGNOSIS — B37.0 THRUSH, ORAL: ICD-10-CM

## 2021-02-22 DIAGNOSIS — I48.91 ATRIAL FIBRILLATION, UNSPECIFIED TYPE (HCC): ICD-10-CM

## 2021-02-22 DIAGNOSIS — M25.562 CHRONIC PAIN OF BOTH KNEES: Primary | ICD-10-CM

## 2021-02-22 NOTE — TELEPHONE ENCOUNTER
Called and spoke with patient.  She stated that she has extremely bad knees and that she recently slid out of the bed not hitting the floor however she was hanging on by the bed rail. Pt stated that since then she has absolutely no use of her knees and its getting impossible to get in and out of a car.  Patient is asking for an order for a hospital bed and would like a new referral to receive HH services through [facility] once again.  Patient stated she is also having chest tightness and is going to try and make an appt with her heart Dr.  Patient stated that she is unsure if this is a panic attack or AFIB.  Patient also stated that she has thrush on the bottom inside of her lip and on the side of her tongue. Patient stated that she took some antibiotics thinking this would help however it has not.  Please advise?

## 2021-02-22 NOTE — TELEPHONE ENCOUNTER
Unfortunately she will need an office visit in order to prescribe the DME that she is requesting.  I will place a referral for home health.  I STRONGLY recommend that she notify cardiology about her chest pain.  I will send a prescription in for her thrush.  The antibiotic will make that worse.  Please discontinue any antibiotic that has not been prescribed for another reason.

## 2021-02-22 NOTE — TELEPHONE ENCOUNTER
PATIENT CALLED WANTING TO SEE ABOUT GETTING SOMETHING TO HELP HER GET A HOSPITAL BED, SHE ALSO WOULD LIKE TO DISCUSS SOME OF HER HEALTH ISSUES SHE IS HAVING WITH THE NURSE.    GOOD CALL BACK  [phone]

## 2021-02-25 ENCOUNTER — TELEPHONE (OUTPATIENT)
Dept: FAMILY MEDICINE CLINIC | Facility: CLINIC | Age: 81
End: 2021-02-25

## 2021-02-25 DIAGNOSIS — L89.312 PRESSURE INJURY OF RIGHT BUTTOCK, STAGE 2 (HCC): Primary | ICD-10-CM

## 2021-02-25 NOTE — TELEPHONE ENCOUNTER
Caller: Isabelle    Relationship to patient:     Best call back number:  [phone]    Patient is needing:    Patient would like an alternating pressure pad added to order for hospital bed from [facility] [place]. The office notes are sufficient.

## 2021-03-05 ENCOUNTER — TELEPHONE (OUTPATIENT)
Dept: FAMILY MEDICINE CLINIC | Facility: CLINIC | Age: 81
End: 2021-03-05

## 2021-03-05 DIAGNOSIS — R29.6 FREQUENT FALLS: ICD-10-CM

## 2021-03-05 DIAGNOSIS — M25.561 CHRONIC PAIN OF BOTH KNEES: ICD-10-CM

## 2021-03-05 DIAGNOSIS — R53.1 GENERAL WEAKNESS: ICD-10-CM

## 2021-03-05 DIAGNOSIS — L89.312 PRESSURE INJURY OF RIGHT BUTTOCK, STAGE 2 (HCC): ICD-10-CM

## 2021-03-05 DIAGNOSIS — G89.29 CHRONIC PAIN OF BOTH KNEES: ICD-10-CM

## 2021-03-05 DIAGNOSIS — M25.562 CHRONIC PAIN OF BOTH KNEES: ICD-10-CM

## 2021-03-05 DIAGNOSIS — E66.01 MORBID (SEVERE) OBESITY DUE TO EXCESS CALORIES (HCC): Primary | ICD-10-CM

## 2021-03-05 NOTE — TELEPHONE ENCOUNTER
Caller: Paradise Whitman    Relationship to patient: Self    Best call back number: [phone]     Patient is needing: PATIENT CALLING IN STATING SHE IS HAVING A HARD TIME GETTING OUT OF BED. PATIENT IS WANTING TO KNOW IF A TRAPEZE BAR COULD BE ORDERED AND SENT TO [facility]. PLEASE ADVISE.

## 2021-03-10 ENCOUNTER — OFFICE VISIT (OUTPATIENT)
Dept: CARDIOLOGY | Facility: CLINIC | Age: 81
End: 2021-03-10

## 2021-03-10 VITALS
HEART RATE: 93 BPM | SYSTOLIC BLOOD PRESSURE: 118 MMHG | WEIGHT: 285 LBS | OXYGEN SATURATION: 98 % | DIASTOLIC BLOOD PRESSURE: 78 MMHG | BODY MASS INDEX: 44.73 KG/M2 | HEIGHT: 67 IN

## 2021-03-10 DIAGNOSIS — Z79.01 LONG TERM (CURRENT) USE OF ANTICOAGULANTS: Chronic | ICD-10-CM

## 2021-03-10 DIAGNOSIS — I48.19 PERSISTENT ATRIAL FIBRILLATION (HCC): Primary | ICD-10-CM

## 2021-03-10 DIAGNOSIS — I10 ESSENTIAL HYPERTENSION: Chronic | ICD-10-CM

## 2021-03-10 DIAGNOSIS — E66.01 MORBIDLY OBESE (HCC): Chronic | ICD-10-CM

## 2021-03-10 PROBLEM — I48.0 PAROXYSMAL ATRIAL FIBRILLATION: Chronic | Status: ACTIVE | Noted: 2020-11-19

## 2021-03-10 PROCEDURE — 93000 ELECTROCARDIOGRAM COMPLETE: CPT | Performed by: NURSE PRACTITIONER

## 2021-03-10 PROCEDURE — 99214 OFFICE O/P EST MOD 30 MIN: CPT | Performed by: NURSE PRACTITIONER

## 2021-03-10 NOTE — PROGRESS NOTES
Subjective:     Encounter Date:03/10/2021      Patient ID: Paradise Whitman is a 80 y.o. female with a history of persistent atrial fibrillation, long term anticoagulation, hypertension, obesity.  She presents today to discuss recent episode of chest pain and shortness of breath related to palpitations.     Chief Complaint: Chest pain, shortness of breath, palpitiations  Atrial Fibrillation  Presents for follow-up visit. Symptoms include chest pain, palpitations, shortness of breath and weakness. Symptoms are negative for dizziness, hypertension, hypotension and syncope. Past medical history includes atrial fibrillation.     The patient initially called for a follow up visit due to complaints of chest tightness, left sided chest discomfort, and shortness of breath associated with periods of palpitations. This was happening off and on for about one week lasting for several minutes at a time.  She reports this was about one month ago and has since resolved and not recurred. She was under some additional stress at the time, and she is unsure if this was associated with that or not. She relates that it felt like a panic attack during the episodes. She was concerned that with her hx of AF she needed a follow up in our office.     The following portions of the patient's history were reviewed and updated as appropriate: allergies, current medications, past family history, past medical history, past social history and past surgical history.     Allergies   Allergen Reactions   • Hydrocodone Swelling   • Macrobid [Nitrofurantoin] Unknown (See Comments)     Ask pt   • Bactrim [Sulfamethoxazole-Trimethoprim] Nausea Only   • Sulfa Antibiotics Nausea Only       Current Outpatient Medications:   •  apixaban (ELIQUIS) 5 MG tablet tablet, Take 1 tablet by mouth Every 12 (Twelve) Hours., Disp: 60 tablet, Rfl: 5  •  azelastine (ASTELIN) 0.1 % nasal spray, 2 sprays into the nostril(s) as directed by provider 2 (Two) Times a Day. Use  in each nostril as directed, Disp: 1 each, Rfl: 12  •  Boswellia-Glucosamine-Vit D (OSTEO BI-FLEX ONE PER DAY PO), Take 1 tablet by mouth Daily., Disp: , Rfl:   •  Diovan -12.5 MG per tablet, TAKE ONE TABLET BY MOUTH EVERY DAY, Disp: 90 tablet, Rfl: 1  •  divalproex (DEPAKOTE) 250 MG DR tablet, TAKE ONE TABLET BY MOUTH TWICE A DAY, Disp: 180 tablet, Rfl: 1  •  ECHINACEA PO, Take 1,200 mg by mouth Daily., Disp: , Rfl:   •  Hemp Oil-Vanillyl Butyl Ether (ProVent Hemp Pain Relief) 3-1 % patch, Apply 40 mg topically As Needed., Disp: , Rfl:   •  lidocaine (LIDODERM) 5 %, Place 1 patch on the skin as directed by provider Daily. Remove & Discard patch within 12 hours or as directed by MD, Disp: 90 each, Rfl: 2  •  mometasone (NASONEX) 50 MCG/ACT nasal spray, 2 sprays into the nostril(s) as directed by provider Daily., Disp: 1 each, Rfl: 12  •  multivitamin with minerals (CENTRUM ULTRA WOMENS PO), Take 1 tablet by mouth Daily., Disp: , Rfl:   •  nystatin (MYCOSTATIN) 654083 UNIT/GM cream, Apply to rash under breasts twice daily until healed., Disp: 30 g, Rfl: 2  •  nystatin (MYCOSTATIN) 059397 UNIT/ML suspension, Swish and swallow 5 mL 4 (Four) Times a Day., Disp: 240 mL, Rfl: 0  •  ondansetron ODT (Zofran ODT) 4 MG disintegrating tablet, Place 1 tablet on the tongue Every 8 (Eight) Hours As Needed for Nausea., Disp: 30 tablet, Rfl: 1  •  sertraline (ZOLOFT) 100 MG tablet, Take 1.5 tablets by mouth Daily., Disp: 135 tablet, Rfl: 1  •  silver sulfadiazine (Silvadene) 1 % cream, Apply to open area on buttock twice daily until healed., Disp: 50 g, Rfl: 2  •  traMADol (ULTRAM) 50 MG tablet, TAKE ONE TABLET BY MOUTH THREE TIMES A DAY AS NEEDED, Disp: 270 tablet, Rfl: 0    Review of Systems   Constitutional: Positive for malaise/fatigue. Negative for chills, diaphoresis and fever.   Cardiovascular: Positive for chest pain and palpitations. Negative for leg swelling, near-syncope, orthopnea, paroxysmal nocturnal dyspnea  "and syncope.   Respiratory: Positive for shortness of breath. Negative for cough and wheezing.    Hematologic/Lymphatic: Negative for bleeding problem. Bruises/bleeds easily.   Musculoskeletal: Positive for joint pain, muscle weakness and stiffness.   Neurological: Positive for weakness. Negative for dizziness, headaches and light-headedness.         ECG 12 Lead    Date/Time: 3/10/2021 4:36 PM  Performed by: Adeinke Manzo APRN  Authorized by: Adenike Manzo APRN   Comparison: compared with previous ECG from 11/19/2020  Similar to previous ECG  Rhythm: atrial fibrillation  BPM: 92  ST Segments: ST segments normal  T Waves: T waves normal    Clinical impression: abnormal EKG          /78   Pulse 93   Ht 170.2 cm (67\")   Wt 129 kg (285 lb)   LMP  (LMP Unknown)   SpO2 98%   BMI 44.64 kg/m²        Objective:     Vitals reviewed.   Constitutional:       Appearance: Chronically ill-appearing.   HENT:      Head: Normocephalic and atraumatic.   Pulmonary:      Effort: Pulmonary effort is normal.      Breath sounds: Normal breath sounds.   Cardiovascular:      Normal rate. Irregularly irregular rhythm.      Murmurs: There is no murmur.      No gallop. No rub.   Edema:     Peripheral edema absent.   Musculoskeletal:      Cervical back: Normal range of motion and neck supple. Skin:     General: Skin is warm and dry.   Neurological:      Mental Status: Alert and oriented to person, place and time.   Psychiatric:         Attention and Perception: Attention normal.         Mood and Affect: Mood normal.         Speech: Speech normal.         Behavior: Behavior normal. Behavior is cooperative.       Lab Review:       Assessment:          Diagnosis Plan   1. Persistent atrial fibrillation (CMS/HCC)     2. Long term (current) use of anticoagulants     3. Essential hypertension     4. Morbidly obese (CMS/HCC)            Plan:       AF: Persistent atrial fibrillation. Rate controlled. Symptoms may have been due to " uncontrolled rates but likely secondary to another cause, the patient reports anxiety and stress at the time. Her rates are well controlled today and her symptoms have resolved. No recurrence in 3-4 weeks. She is not on any rate controlling medications at this time, but this can be considered, if needed, in the future. We discussed this today. Due to her returning to her baseline and being stable for several weeks, we will not make adjustments today.    Anticoagulation: continue with Eliquis 5 mg BID for stroke risk reduction in the setting of AF.    HTN: managed through her PCP office. Well controlled.    Obesity: BMI: 44.64 She has struggled with weight gain and her functional capacity is low due to orthopedic and musculoskeletal issues with her knees.         No changes to medications at this time. We will consider rate controlling medications in future if needed. Follow up 6 months.

## 2021-03-24 DIAGNOSIS — M25.561 CHRONIC PAIN OF BOTH KNEES: ICD-10-CM

## 2021-03-24 DIAGNOSIS — G89.29 CHRONIC PAIN OF BOTH KNEES: ICD-10-CM

## 2021-03-24 DIAGNOSIS — M25.562 CHRONIC PAIN OF BOTH KNEES: ICD-10-CM

## 2021-03-24 RX ORDER — SERTRALINE HYDROCHLORIDE 100 MG/1
TABLET, FILM COATED ORAL
Qty: 135 TABLET | Refills: 1 | Status: SHIPPED | OUTPATIENT
Start: 2021-03-24

## 2021-03-24 RX ORDER — TRAMADOL HYDROCHLORIDE 50 MG/1
TABLET ORAL
Qty: 270 TABLET | Refills: 0 | Status: SHIPPED | OUTPATIENT
Start: 2021-03-24

## 2021-03-25 ENCOUNTER — TELEPHONE (OUTPATIENT)
Dept: FAMILY MEDICINE CLINIC | Facility: CLINIC | Age: 81
End: 2021-03-25

## 2021-03-25 NOTE — TELEPHONE ENCOUNTER
Caller: Paradise Whitman    Relationship: Self    Best call back number: [phone]    What is the best time to reach you: ANY TIME    Who are you requesting to speak with (clinical staff, provider,  specific staff member): CLINICAL    What was the call regarding: PATIENT CALLED WONDERING IF HER FRIENED COULD COME IN AND  HER MEDICATION. ADELIA TAO IS HER FRIEND AND SHE IS NOT ON THE  VERBAL. SHE IS UNABLE TO GET OUT OF THE VEHICLE AND NEEDS HER FRIEND DO DO THINGS FOR HER.    Do you require a callback: YES

## 2021-03-26 ENCOUNTER — CLINICAL SUPPORT (OUTPATIENT)
Dept: FAMILY MEDICINE CLINIC | Facility: CLINIC | Age: 81
End: 2021-03-26

## 2021-03-26 DIAGNOSIS — R31.9 URINARY TRACT INFECTION WITH HEMATURIA, SITE UNSPECIFIED: Primary | ICD-10-CM

## 2021-03-26 DIAGNOSIS — N39.0 URINARY TRACT INFECTION WITH HEMATURIA, SITE UNSPECIFIED: Primary | ICD-10-CM

## 2021-03-26 LAB
BILIRUB BLD-MCNC: NEGATIVE MG/DL
CLARITY, POC: ABNORMAL
COLOR UR: ABNORMAL
GLUCOSE UR STRIP-MCNC: NEGATIVE MG/DL
KETONES UR QL: NEGATIVE
LEUKOCYTE EST, POC: ABNORMAL
NITRITE UR-MCNC: POSITIVE MG/ML
PH UR: 7 [PH] (ref 5–8)
PROT UR STRIP-MCNC: ABNORMAL MG/DL
RBC # UR STRIP: ABNORMAL /UL
SP GR UR: 1.03 (ref 1–1.03)
UROBILINOGEN UR QL: NORMAL

## 2021-03-26 PROCEDURE — 81003 URINALYSIS AUTO W/O SCOPE: CPT | Performed by: NURSE PRACTITIONER

## 2021-03-26 RX ORDER — CEPHALEXIN 500 MG/1
500 CAPSULE ORAL 3 TIMES DAILY
Qty: 15 CAPSULE | Refills: 0 | OUTPATIENT
Start: 2021-03-26 | End: 2021-03-30

## 2021-03-26 NOTE — PROGRESS NOTES
Patient presented with symptoms of UTI, urine specimen collected. Tested and resulted to provider.

## 2021-03-30 ENCOUNTER — HOSPITAL ENCOUNTER (EMERGENCY)
Facility: HOSPITAL | Age: 81
Discharge: HOME | End: 2021-03-30
Attending: EMERGENCY MEDICINE | Admitting: EMERGENCY MEDICINE

## 2021-03-30 ENCOUNTER — APPOINTMENT (OUTPATIENT)
Dept: GENERAL RADIOLOGY | Facility: HOSPITAL | Age: 81
End: 2021-03-30

## 2021-03-30 ENCOUNTER — APPOINTMENT (OUTPATIENT)
Dept: CT IMAGING | Facility: HOSPITAL | Age: 81
End: 2021-03-30

## 2021-03-30 ENCOUNTER — TELEPHONE (OUTPATIENT)
Dept: FAMILY MEDICINE CLINIC | Facility: CLINIC | Age: 81
End: 2021-03-30

## 2021-03-30 VITALS
WEIGHT: 293 LBS | HEART RATE: 98 BPM | BODY MASS INDEX: 45.99 KG/M2 | DIASTOLIC BLOOD PRESSURE: 98 MMHG | HEIGHT: 67 IN | SYSTOLIC BLOOD PRESSURE: 158 MMHG | TEMPERATURE: 98.6 F | OXYGEN SATURATION: 99 % | RESPIRATION RATE: 18 BRPM

## 2021-03-30 DIAGNOSIS — N39.0 ACUTE UTI (URINARY TRACT INFECTION): Primary | ICD-10-CM

## 2021-03-30 DIAGNOSIS — Z79.01 CHRONIC ANTICOAGULATION: ICD-10-CM

## 2021-03-30 DIAGNOSIS — I48.0 PAROXYSMAL ATRIAL FIBRILLATION (HCC): ICD-10-CM

## 2021-03-30 DIAGNOSIS — N13.5 URETERAL STRICTURE: ICD-10-CM

## 2021-03-30 DIAGNOSIS — N13.9 OBSTRUCTIVE UROPATHY: ICD-10-CM

## 2021-03-30 LAB
ALBUMIN SERPL-MCNC: 3.2 G/DL (ref 3.5–5.2)
ALBUMIN/GLOB SERPL: 0.8 G/DL
ALP SERPL-CCNC: 98 U/L (ref 39–117)
ALT SERPL W P-5'-P-CCNC: 17 U/L (ref 1–33)
ANION GAP SERPL CALCULATED.3IONS-SCNC: 8 MMOL/L (ref 5–15)
ARTERIAL PATENCY WRIST A: ABNORMAL
AST SERPL-CCNC: 39 U/L (ref 1–32)
ATMOSPHERIC PRESS: 750 MMHG
BACTERIA UR QL AUTO: ABNORMAL /HPF
BASE EXCESS BLDA CALC-SCNC: 4.9 MMOL/L (ref 0–2)
BASOPHILS # BLD AUTO: 0.06 10*3/MM3 (ref 0–0.2)
BASOPHILS NFR BLD AUTO: 0.7 % (ref 0–1.5)
BDY SITE: ABNORMAL
BILIRUB SERPL-MCNC: 0.5 MG/DL (ref 0–1.2)
BILIRUB UR QL STRIP: NEGATIVE
BODY TEMPERATURE: 37 C
BUN SERPL-MCNC: 20 MG/DL (ref 8–23)
BUN/CREAT SERPL: 21.5 (ref 7–25)
CALCIUM SPEC-SCNC: 8.8 MG/DL (ref 8.6–10.5)
CHLORIDE SERPL-SCNC: 97 MMOL/L (ref 98–107)
CLARITY UR: ABNORMAL
CO2 SERPL-SCNC: 31 MMOL/L (ref 22–29)
COLOR UR: YELLOW
CREAT SERPL-MCNC: 0.93 MG/DL (ref 0.57–1)
D DIMER PPP FEU-MCNC: 0.72 MG/L (FEU) (ref 0–0.5)
D-LACTATE SERPL-SCNC: 1.5 MMOL/L (ref 0.5–2)
DEPRECATED RDW RBC AUTO: 46.5 FL (ref 37–54)
EOSINOPHIL # BLD AUTO: 0.16 10*3/MM3 (ref 0–0.4)
EOSINOPHIL NFR BLD AUTO: 1.8 % (ref 0.3–6.2)
ERYTHROCYTE [DISTWIDTH] IN BLOOD BY AUTOMATED COUNT: 13 % (ref 12.3–15.4)
GFR SERPL CREATININE-BSD FRML MDRD: 58 ML/MIN/1.73
GLOBULIN UR ELPH-MCNC: 3.9 GM/DL
GLUCOSE SERPL-MCNC: 110 MG/DL (ref 65–99)
GLUCOSE UR STRIP-MCNC: NEGATIVE MG/DL
HCO3 BLDA-SCNC: 29.6 MMOL/L (ref 20–26)
HCT VFR BLD AUTO: 38.3 % (ref 34–46.6)
HGB BLD-MCNC: 12.7 G/DL (ref 12–15.9)
HGB UR QL STRIP.AUTO: ABNORMAL
HYALINE CASTS UR QL AUTO: ABNORMAL /LPF
IMM GRANULOCYTES # BLD AUTO: 0.07 10*3/MM3 (ref 0–0.05)
IMM GRANULOCYTES NFR BLD AUTO: 0.8 % (ref 0–0.5)
KETONES UR QL STRIP: NEGATIVE
LEUKOCYTE ESTERASE UR QL STRIP.AUTO: ABNORMAL
LYMPHOCYTES # BLD AUTO: 0.65 10*3/MM3 (ref 0.7–3.1)
LYMPHOCYTES NFR BLD AUTO: 7.3 % (ref 19.6–45.3)
Lab: ABNORMAL
MCH RBC QN AUTO: 32.6 PG (ref 26.6–33)
MCHC RBC AUTO-ENTMCNC: 33.2 G/DL (ref 31.5–35.7)
MCV RBC AUTO: 98.2 FL (ref 79–97)
MODALITY: ABNORMAL
MONOCYTES # BLD AUTO: 1.08 10*3/MM3 (ref 0.1–0.9)
MONOCYTES NFR BLD AUTO: 12.1 % (ref 5–12)
NEUTROPHILS NFR BLD AUTO: 6.88 10*3/MM3 (ref 1.7–7)
NEUTROPHILS NFR BLD AUTO: 77.3 % (ref 42.7–76)
NITRITE UR QL STRIP: POSITIVE
NRBC BLD AUTO-RTO: 0 /100 WBC (ref 0–0.2)
NT-PROBNP SERPL-MCNC: 788.1 PG/ML (ref 0–1800)
PCO2 BLDA: 43.2 MM HG (ref 35–45)
PCO2 TEMP ADJ BLD: 43.2 MM HG (ref 35–45)
PH BLDA: 7.44 PH UNITS (ref 7.35–7.45)
PH UR STRIP.AUTO: 7.5 [PH] (ref 5–8)
PH, TEMP CORRECTED: 7.44 PH UNITS (ref 7.35–7.45)
PLATELET # BLD AUTO: 277 10*3/MM3 (ref 140–450)
PMV BLD AUTO: 10.4 FL (ref 6–12)
PO2 BLDA: 74.1 MM HG (ref 83–108)
PO2 TEMP ADJ BLD: 74.1 MM HG (ref 83–108)
POTASSIUM SERPL-SCNC: 4.5 MMOL/L (ref 3.5–5.2)
PROCALCITONIN SERPL-MCNC: 0.12 NG/ML (ref 0–0.25)
PROT SERPL-MCNC: 7.1 G/DL (ref 6–8.5)
PROT UR QL STRIP: ABNORMAL
RBC # BLD AUTO: 3.9 10*6/MM3 (ref 3.77–5.28)
RBC # UR: ABNORMAL /HPF
REF LAB TEST METHOD: ABNORMAL
SAO2 % BLDCOA: 94.3 % (ref 94–99)
SODIUM SERPL-SCNC: 136 MMOL/L (ref 136–145)
SP GR UR STRIP: 1.01 (ref 1–1.03)
SQUAMOUS #/AREA URNS HPF: ABNORMAL /HPF
TROPONIN T SERPL-MCNC: 0.01 NG/ML (ref 0–0.03)
UROBILINOGEN UR QL STRIP: ABNORMAL
VENTILATOR MODE: ABNORMAL
WBC # BLD AUTO: 8.9 10*3/MM3 (ref 3.4–10.8)
WBC UR QL AUTO: ABNORMAL /HPF

## 2021-03-30 PROCEDURE — 0 IOPAMIDOL PER 1 ML: Performed by: EMERGENCY MEDICINE

## 2021-03-30 PROCEDURE — 83880 ASSAY OF NATRIURETIC PEPTIDE: CPT | Performed by: EMERGENCY MEDICINE

## 2021-03-30 PROCEDURE — 99284 EMERGENCY DEPT VISIT MOD MDM: CPT

## 2021-03-30 PROCEDURE — 84484 ASSAY OF TROPONIN QUANT: CPT | Performed by: EMERGENCY MEDICINE

## 2021-03-30 PROCEDURE — 93005 ELECTROCARDIOGRAM TRACING: CPT | Performed by: EMERGENCY MEDICINE

## 2021-03-30 PROCEDURE — 80053 COMPREHEN METABOLIC PANEL: CPT | Performed by: EMERGENCY MEDICINE

## 2021-03-30 PROCEDURE — 88112 CYTOPATH CELL ENHANCE TECH: CPT | Performed by: EMERGENCY MEDICINE

## 2021-03-30 PROCEDURE — 81001 URINALYSIS AUTO W/SCOPE: CPT | Performed by: EMERGENCY MEDICINE

## 2021-03-30 PROCEDURE — 71275 CT ANGIOGRAPHY CHEST: CPT

## 2021-03-30 PROCEDURE — 87077 CULTURE AEROBIC IDENTIFY: CPT | Performed by: EMERGENCY MEDICINE

## 2021-03-30 PROCEDURE — 82803 BLOOD GASES ANY COMBINATION: CPT

## 2021-03-30 PROCEDURE — 83605 ASSAY OF LACTIC ACID: CPT | Performed by: EMERGENCY MEDICINE

## 2021-03-30 PROCEDURE — 36600 WITHDRAWAL OF ARTERIAL BLOOD: CPT

## 2021-03-30 PROCEDURE — P9612 CATHETERIZE FOR URINE SPEC: HCPCS

## 2021-03-30 PROCEDURE — 84145 PROCALCITONIN (PCT): CPT | Performed by: EMERGENCY MEDICINE

## 2021-03-30 PROCEDURE — 87040 BLOOD CULTURE FOR BACTERIA: CPT | Performed by: EMERGENCY MEDICINE

## 2021-03-30 PROCEDURE — 87086 URINE CULTURE/COLONY COUNT: CPT | Performed by: EMERGENCY MEDICINE

## 2021-03-30 PROCEDURE — 93010 ELECTROCARDIOGRAM REPORT: CPT | Performed by: INTERNAL MEDICINE

## 2021-03-30 PROCEDURE — 96365 THER/PROPH/DIAG IV INF INIT: CPT

## 2021-03-30 PROCEDURE — 85379 FIBRIN DEGRADATION QUANT: CPT | Performed by: EMERGENCY MEDICINE

## 2021-03-30 PROCEDURE — 36415 COLL VENOUS BLD VENIPUNCTURE: CPT

## 2021-03-30 PROCEDURE — 25010000002 ERTAPENEM PER 500 MG: Performed by: EMERGENCY MEDICINE

## 2021-03-30 PROCEDURE — 85025 COMPLETE CBC W/AUTO DIFF WBC: CPT | Performed by: EMERGENCY MEDICINE

## 2021-03-30 PROCEDURE — 87186 SC STD MICRODIL/AGAR DIL: CPT | Performed by: EMERGENCY MEDICINE

## 2021-03-30 PROCEDURE — 71045 X-RAY EXAM CHEST 1 VIEW: CPT

## 2021-03-30 PROCEDURE — 74177 CT ABD & PELVIS W/CONTRAST: CPT

## 2021-03-30 RX ORDER — LEVOFLOXACIN 500 MG/1
500 TABLET, FILM COATED ORAL DAILY
Qty: 10 TABLET | Refills: 0 | Status: SHIPPED | OUTPATIENT
Start: 2021-03-30 | End: 2021-04-09

## 2021-03-30 RX ADMIN — IOPAMIDOL 150 ML: 755 INJECTION, SOLUTION INTRAVENOUS at 15:55

## 2021-03-30 RX ADMIN — ERTAPENEM SODIUM 1 G: 1 INJECTION, POWDER, LYOPHILIZED, FOR SOLUTION INTRAMUSCULAR; INTRAVENOUS at 17:16

## 2021-03-30 NOTE — TELEPHONE ENCOUNTER
Caller: Paradise Whitman    Relationship: Self    Best call back number: [phone]    What is the best time to reach you: ANY  What was the call regarding:  PATIENT FEELS LIKE SHE WILL CALL AMBULANCE TO GO TO HOSPITAL FOR KIDNEY PAIN AND A FEVER ( FEVER BROKE THIS MORNING). AND SHORTNESS OF BREATH, WHEEZING. SHE WILL GO TO [facility]       PLEASE CALL BACK ASAP

## 2021-03-31 LAB
QT INTERVAL: 354 MS
QTC INTERVAL: 467 MS

## 2021-04-01 LAB — BACTERIA SPEC AEROBE CULT: ABNORMAL

## 2021-04-04 LAB
BACTERIA SPEC AEROBE CULT: NORMAL
BACTERIA SPEC AEROBE CULT: NORMAL

## 2021-04-05 LAB
CYTO UR: NORMAL
LAB AP CASE REPORT: NORMAL
LAB AP NON-GYN INTERPRETATION: NORMAL
PATH REPORT.FINAL DX SPEC: NORMAL
PATH REPORT.GROSS SPEC: NORMAL

## 2021-04-08 ENCOUNTER — OFFICE VISIT (OUTPATIENT)
Dept: FAMILY MEDICINE CLINIC | Facility: CLINIC | Age: 81
End: 2021-04-08

## 2021-04-08 VITALS
OXYGEN SATURATION: 89 % | DIASTOLIC BLOOD PRESSURE: 81 MMHG | HEART RATE: 105 BPM | BODY MASS INDEX: 46.83 KG/M2 | HEIGHT: 67 IN | TEMPERATURE: 98 F | SYSTOLIC BLOOD PRESSURE: 112 MMHG

## 2021-04-08 DIAGNOSIS — E66.01 MORBID (SEVERE) OBESITY DUE TO EXCESS CALORIES (HCC): ICD-10-CM

## 2021-04-08 DIAGNOSIS — G89.29 CHRONIC PAIN OF BOTH KNEES: Primary | ICD-10-CM

## 2021-04-08 DIAGNOSIS — R29.6 FREQUENT FALLS: ICD-10-CM

## 2021-04-08 DIAGNOSIS — R26.2 UNABLE TO AMBULATE: ICD-10-CM

## 2021-04-08 DIAGNOSIS — H65.02 NON-RECURRENT ACUTE SEROUS OTITIS MEDIA OF LEFT EAR: ICD-10-CM

## 2021-04-08 DIAGNOSIS — I48.91 NEW ONSET ATRIAL FIBRILLATION (HCC): ICD-10-CM

## 2021-04-08 DIAGNOSIS — I10 ESSENTIAL HYPERTENSION: ICD-10-CM

## 2021-04-08 DIAGNOSIS — R53.1 GENERAL WEAKNESS: ICD-10-CM

## 2021-04-08 DIAGNOSIS — M25.561 CHRONIC PAIN OF BOTH KNEES: Primary | ICD-10-CM

## 2021-04-08 DIAGNOSIS — M25.562 CHRONIC PAIN OF BOTH KNEES: Primary | ICD-10-CM

## 2021-04-08 DIAGNOSIS — B37.2 CUTANEOUS CANDIDIASIS: ICD-10-CM

## 2021-04-08 DIAGNOSIS — B37.0 ORAL CANDIDIASIS: ICD-10-CM

## 2021-04-08 DIAGNOSIS — L89.312 PRESSURE INJURY OF RIGHT BUTTOCK, STAGE 2 (HCC): ICD-10-CM

## 2021-04-08 DIAGNOSIS — Z74.09 IMPAIRED MOBILITY: ICD-10-CM

## 2021-04-08 PROCEDURE — 99214 OFFICE O/P EST MOD 30 MIN: CPT | Performed by: NURSE PRACTITIONER

## 2021-04-08 RX ORDER — VALSARTAN AND HYDROCHLOROTHIAZIDE 160; 12.5 MG/1; MG/1
1 TABLET, FILM COATED ORAL DAILY
Qty: 90 TABLET | Refills: 1 | Status: SHIPPED | OUTPATIENT
Start: 2021-04-08

## 2021-04-08 RX ORDER — NYSTATIN 100000 U/G
CREAM TOPICAL
Qty: 30 G | Refills: 2 | Status: SHIPPED | OUTPATIENT
Start: 2021-04-08

## 2021-04-08 RX ORDER — LIDOCAINE 50 MG/G
1 PATCH TOPICAL EVERY 24 HOURS
Qty: 90 EACH | Refills: 2 | Status: SHIPPED | OUTPATIENT
Start: 2021-04-08

## 2021-04-08 RX ORDER — AZELASTINE 1 MG/ML
2 SPRAY, METERED NASAL 2 TIMES DAILY
Qty: 1 EACH | Refills: 12 | Status: SHIPPED | OUTPATIENT
Start: 2021-04-08

## 2021-04-08 NOTE — PROGRESS NOTES
"Chief Complaint  Pain (UTD ON UDS/CONTRACT) and Med Refill (multiple meds)    Subjective          Paradise Whitman presents to [facility]  History of Present Illness  UTI:  Patient was recently seen and treated at [facility] for a UTI.  Records are reviewed.  She has one more dose of Levaquin to take.  She has recurrent UTI's secondary to incontinence of both urine and stool.  She is currently unable to toilet due to knee pain and can only stand and pivot.  She is unable to get her wheelchair into her bathroom to even attempt to use the toilet.  She is in need of a bariatric bedside commode.  MED REFILLS:  On multiple medications.  Stable.  Unsure why she is taking Depakote.  She does not have a seizure history.  She expresses desire to stop the medication if possible.  DME:  Bariatric bedside commode needed as noted above.    Objective   Vital Signs:   /81 (BP Location: Right arm, Patient Position: Sitting, Cuff Size: Large Adult)   Pulse 105   Temp 98 °F (36.7 °C)   Ht 170.2 cm (67\") Comment: pt reported  SpO2 (!) 89%   BMI 46.83 kg/m²       Physical Exam  Vitals and nursing note reviewed. Exam conducted with a chaperone present (Caregiver.).   Constitutional:       General: She is not in acute distress.     Appearance: Normal appearance. She is well-developed. She is obese. She is not ill-appearing or diaphoretic.      Comments: Patient smells of urine.   HENT:      Head: Normocephalic and atraumatic.   Eyes:      Conjunctiva/sclera: Conjunctivae normal.      Pupils: Pupils are equal, round, and reactive to light.   Cardiovascular:      Rate and Rhythm: Normal rate and regular rhythm.      Heart sounds: Normal heart sounds. No murmur heard.     Pulmonary:      Effort: Pulmonary effort is normal. No respiratory distress.      Breath sounds: Wheezing present.      Comments: Fine expiratory wheezes of upper lobes.  Abdominal:      General: Bowel sounds are normal. There is no " distension.      Palpations: Abdomen is soft.      Tenderness: There is no abdominal tenderness.   Musculoskeletal:         General: Swelling and tenderness present. Normal range of motion.      Cervical back: Normal range of motion and neck supple.      Comments: Poor ROM effort to bilateral knees with evidence of effusion and crepitus noted.   Skin:     General: Skin is warm and dry.      Capillary Refill: Capillary refill takes less than 2 seconds.      Findings: Erythema present.      Comments: Small open areas of breakdown on bilateral buttocks near the sacrum.  Mild surrounding erythema is present.  No drainage.  Epithelial budding is evident.   Neurological:      General: No focal deficit present.      Mental Status: She is alert and oriented to person, place, and time. Mental status is at baseline.   Psychiatric:         Mood and Affect: Mood normal.         Behavior: Behavior normal.         Thought Content: Thought content normal.         Judgment: Judgment normal.        Result Review :                 Assessment and Plan    Diagnoses and all orders for this visit:    1. Chronic pain of both knees (Primary)  -     Miscellaneous DME  -     lidocaine (LIDODERM) 5 %; Place 1 patch on the skin as directed by provider Daily. Remove & Discard patch within 12 hours or as directed by MD  Dispense: 90 each; Refill: 2    2. Morbid (severe) obesity due to excess calories (CMS/HCC)  -     Miscellaneous DME    3. General weakness  -     Miscellaneous DME    4. Frequent falls  -     Miscellaneous DME    5. Impaired mobility  -     Miscellaneous DME    6. Unable to ambulate  -     Miscellaneous DME    7. Body mass index (BMI) of 45.0 to 49.9 in adult (CMS/HCC)  -     Miscellaneous DME    8. New onset atrial fibrillation (CMS/HCC)  -     apixaban (ELIQUIS) 5 MG tablet tablet; Take 1 tablet by mouth Every 12 (Twelve) Hours.  Dispense: 180 tablet; Refill: 1    9. Essential hypertension  -     valsartan-hydrochlorothiazide  (Diovan HCT) 160-12.5 MG per tablet; Take 1 tablet by mouth Daily.  Dispense: 90 tablet; Refill: 1    10. Cutaneous candidiasis  -     nystatin (MYCOSTATIN) 141168 UNIT/GM cream; Apply to rash under breasts twice daily until healed.  Dispense: 30 g; Refill: 2    11. Oral candidiasis  -     nystatin (MYCOSTATIN) 004694 UNIT/ML suspension; Swish and swallow 5 mL 4 (Four) Times a Day.  Dispense: 240 mL; Refill: 1    12. Non-recurrent acute serous otitis media of left ear  -     azelastine (ASTELIN) 0.1 % nasal spray; 2 sprays into the nostril(s) as directed by provider 2 (Two) Times a Day. Use in each nostril as directed  Dispense: 1 each; Refill: 12    13. Pressure injury of right buttock, stage 2 (CMS/HCC)  -     silver sulfadiazine (Silvadene) 1 % cream; Apply to open area on buttock twice daily until healed.  Dispense: 400 g; Refill: 2        Follow Up   Return in about 3 months (around 7/8/2021) for Medicare Wellness with labs prior.  Patient was given instructions and counseling regarding her condition or for health maintenance advice. Please see specific information pulled into the AVS if appropriate.

## 2021-04-09 ENCOUNTER — TELEPHONE (OUTPATIENT)
Dept: FAMILY MEDICINE CLINIC | Facility: CLINIC | Age: 81
End: 2021-04-09

## 2021-04-09 NOTE — TELEPHONE ENCOUNTER
Caller: YAIR PUENTES    Relationship: Emergency Contact    Best call back number: [phone]     What is the best time to reach you: ANYTIME. BEFORE THE DAY IS OVER.     Who are you requesting to speak with (clinical staff, provider,  specific staff member): PROVIDER     Do you know the name of the person who called: YAIR PUENTES     What was the call regarding: DAUGHTER HAS SOME QUESTIONS ABOUT PATIENT'S HEALTH. SHE IS WANTING A CALL BACK.     Do you require a callback: YES

## 2021-04-09 NOTE — TELEPHONE ENCOUNTER
PATIENTS DAUGHTER HAS CALLED AGAIN REQUESTING A CALL BACK FROM PCP. DAUGHTER STATED SHE WILL BE UNAVAILABLE TO SPEAK Monday AS SHE WILL BE WORKING OUT OF TOWN.     PLEASE CALL AND ADVISE: [phone]

## 2021-04-09 NOTE — TELEPHONE ENCOUNTER
Called patient's daughter back and she is requesting to speak with Mrs. Gonzalez. Patient's daughter was placed on hold and Mrs. Gonzalez spoke wit her

## 2021-04-12 NOTE — TELEPHONE ENCOUNTER
PATIENT HAS CALLED, MENTIONING SHE MISSED A CALL FROM THE OFFICE THIS MORNING AROUND 11AM AND HAD THOUGHT IT WAS MRS. ANITRA MCCONNELL.     PLEASE CALL AND ADVISE: [phone]

## 2021-04-13 ENCOUNTER — TELEPHONE (OUTPATIENT)
Dept: FAMILY MEDICINE CLINIC | Facility: CLINIC | Age: 81
End: 2021-04-13

## 2021-04-13 NOTE — TELEPHONE ENCOUNTER
Spoke with patient, bedside commode is being sent to [facility] in [place].

## 2021-04-21 ENCOUNTER — TELEPHONE (OUTPATIENT)
Dept: FAMILY MEDICINE CLINIC | Facility: CLINIC | Age: 81
End: 2021-04-21

## 2021-04-21 NOTE — TELEPHONE ENCOUNTER
Caller: YAIR PUENTES    Relationship to patient: Emergency Contact    Best call back number: [phone]    Patient is needing: The patient will be placed with a skilled nursing facility/[facility] soon. They will fax over paperwork for completion today. Her daughter would like to make sure everything that is needed will be sent . Please contact her before sending paperwork back.

## 2021-04-23 ENCOUNTER — TELEPHONE (OUTPATIENT)
Dept: FAMILY MEDICINE CLINIC | Facility: CLINIC | Age: 81
End: 2021-04-23

## 2021-04-23 DIAGNOSIS — I10 ESSENTIAL HYPERTENSION: ICD-10-CM

## 2021-04-23 DIAGNOSIS — E66.01 MORBID (SEVERE) OBESITY DUE TO EXCESS CALORIES (HCC): ICD-10-CM

## 2021-04-23 DIAGNOSIS — Z74.09 IMPAIRED MOBILITY: ICD-10-CM

## 2021-04-23 DIAGNOSIS — G89.29 CHRONIC PAIN OF BOTH KNEES: Primary | ICD-10-CM

## 2021-04-23 DIAGNOSIS — R29.6 FREQUENT FALLS: ICD-10-CM

## 2021-04-23 DIAGNOSIS — M25.562 CHRONIC PAIN OF BOTH KNEES: Primary | ICD-10-CM

## 2021-04-23 DIAGNOSIS — I48.91 ATRIAL FIBRILLATION, UNSPECIFIED TYPE (HCC): ICD-10-CM

## 2021-04-23 DIAGNOSIS — M25.561 CHRONIC PAIN OF BOTH KNEES: Primary | ICD-10-CM

## 2021-04-23 DIAGNOSIS — M17.0 PRIMARY OSTEOARTHRITIS OF BOTH KNEES: ICD-10-CM

## 2021-04-23 DIAGNOSIS — R53.1 GENERAL WEAKNESS: ICD-10-CM

## 2021-04-23 NOTE — TELEPHONE ENCOUNTER
Katharine from the [facility] at the [facility] called and is needing an order for admissions to a long term skilled nursing facility. She is arriving there today and they are needing the order before she arrives this afternoon.  She is also needing a signed medication list so that she can continue to receive her medication.       Please let me know when it is done and I will fax it to Katharine at the [facility]       (copied from duplicate encounter created 4/23/21)

## 2021-04-23 NOTE — TELEPHONE ENCOUNTER
Katharine from the [facility] at the [facility] called and is needing an order for admissions to a long term skilled nursing facility. She is arriving there today and they are needing the order before she arrives this afternoon.  She is also needing a signed medication list so that she can continue to receive her medication.      Please let me know when it is done and I will fax it to Katharine at the [facility]

## 2021-05-10 ENCOUNTER — TELEPHONE (OUTPATIENT)
Dept: FAMILY MEDICINE CLINIC | Facility: CLINIC | Age: 81
End: 2021-05-10

## 2021-05-10 NOTE — TELEPHONE ENCOUNTER
Caller: Paradise Whitman    Relationship to patient: Self    Best call back number: [phone]     Patient is needing: PATIENT STATES THAT [facility] WONT FILL HER  LIDOCAINE PATCHES. PATIENT WOULD LIKE A CALL TO DISCUSS

## 2021-05-12 NOTE — TELEPHONE ENCOUNTER
Patient states [facility] is to be sending us a denial letter. Stating why the patches has been denied.

## 2021-08-09 RX ORDER — SERTRALINE HYDROCHLORIDE 100 MG/1
TABLET, FILM COATED ORAL
Qty: 135 TABLET | Refills: 3 | OUTPATIENT
Start: 2021-08-09

## 2021-09-27 DIAGNOSIS — I48.91 NEW ONSET ATRIAL FIBRILLATION (HCC): ICD-10-CM

## 2021-09-27 RX ORDER — APIXABAN 5 MG/1
TABLET, FILM COATED ORAL
Qty: 180 TABLET | Refills: 3 | OUTPATIENT
Start: 2021-09-27

## 2022-01-10 DIAGNOSIS — I10 ESSENTIAL HYPERTENSION: ICD-10-CM

## 2022-01-10 RX ORDER — VALSARTAN AND HYDROCHLOROTHIAZIDE 160; 12.5 MG/1; MG/1
TABLET, FILM COATED ORAL
Qty: 90 TABLET | Refills: 3 | OUTPATIENT
Start: 2022-01-10

## 2022-01-10 NOTE — TELEPHONE ENCOUNTER
Rx Refill Note  Requested Prescriptions     Pending Prescriptions Disp Refills   • valsartan-hydrochlorothiazide (DIOVAN-HCT) 160-12.5 MG per tablet [Pharmacy Med Name: VALSARTAN/HCTZ TABS 160/12.5MG] 90 tablet 3     Sig: TAKE 1 TABLET DAILY      Last office visit with prescribing clinician: 4/8/2021      Next office visit with prescribing clinician: Visit date not found   {  Aggie Oconnor MA  01/10/22, 07:54 CST

## 2024-08-12 ENCOUNTER — READMISSION MANAGEMENT (OUTPATIENT)
Dept: CALL CENTER | Facility: HOSPITAL | Age: 84
End: 2024-08-12
Payer: MEDICARE

## 2024-08-13 NOTE — OUTREACH NOTE
Prep Survey      Flowsheet Row Responses   [facility] facility patient discharged from? Non-BH   Is LACE score < 7 ? Non-BH Discharge   Eligibility Not Eligible   What are the reasons patient is not eligible? Hospice/Pallative Care   Does the patient have one of the following disease processes/diagnoses(primary or secondary)? Other   Prep survey completed? Yes            GINNY A - Registered Nurse

## 2024-09-18 ENCOUNTER — READMISSION MANAGEMENT (OUTPATIENT)
Dept: CALL CENTER | Facility: HOSPITAL | Age: 84
End: 2024-09-18
Payer: MEDICARE